# Patient Record
Sex: FEMALE | Race: WHITE
[De-identification: names, ages, dates, MRNs, and addresses within clinical notes are randomized per-mention and may not be internally consistent; named-entity substitution may affect disease eponyms.]

---

## 2019-11-25 NOTE — NUR
TELE RN NOTES 



ON TELEMONITORING SR 55. RESTING COMFORTABLY IN BED. LAILA MECHANICAL VENTILATION WELL AS 
ORDERED. NO SOB. ABLE TO MAKE NEEDS KNOWN. IN NO APPARENT DISTRESS. BED IN LOWEST POSITION, 
LOCKED. CALL LIGHT WITHIN REACH.

## 2019-11-25 NOTE — NUR
RT NOTE, PT. REC @0940 AM 

74 Y OLD FEMALE AWAKE NON VERBAL, 

TRACH'D KHALIDA # 6 ON PLACED ON VENT WITH NOTED SIMV SETTINGS. PER MD ORDER.

EQUAL CHEST RISE NOTED B/S BILATERALLY RALES SUX'D FOR MINIMAL AMT OF WHITE THICK 
SECRETIONS, NO CHANGES MLT DONE. ALARMS ARE SET AND FUNCTIONAL.

PT. VENT PLUGGED INTO RED OUT LET. AMBU BAG/EXTRA TRACH AT BEDSIDE.  WILL CONTINUE TO 
MONITOR. PT. STABLE AND VITAL SIGN WITHIN NORMAL RANGE.


-------------------------------------------------------------------------------

Addendum: 11/25/19 at 1217 by JAVIER SANTIAGO RT

-------------------------------------------------------------------------------

Amended: Links added.

## 2019-11-25 NOTE — NUR
TELE RN NOTES



RECEIVED PATIENT VIA GURTERESA. ALERT AND ORIENTED X4. PATIENT NON VERBAL BUT ABLE TO MOUTH 
NEEDS AND ANSWER QUESTIONS. RIGHT UPPER CHEST HD CATHETER INTACT. ON SIMV 16  FIO2 OF 
40% PEEP OF 5 WITH SHILEY #6 TRACH INTACT LAILA WELL WITHOUT S/S OF RESPIRATORY DISTRESS. 
ORIENTED PATIENT TO ROOM, CALL LIGHT AND UNIT. BED IN LOWEST POSITION, LOCKED. RIGHT WRIST # 
20 INTACT AND PATENT WITHOUT S/S OF COMPLICATIONS. SON AT BEDSIDE. BED SIDERAILS UP X2. BED 
ALARM ON. GT INTACT AND PATENT. NO RESIDUAL OBSERVED.

## 2019-11-25 NOTE — NUR
PT BIBA FROM [facility] C/O HYPOTENSION SBP 98 PER REPORT. PT PLACED ON A CARDIAC 
MONITOR AND POX. MD AND RT AT BEDSIDE. IV LINE ESTABLISHED 20G RW . 
PHLEBOTOMIST AT BEDSIDE FOR COLLECTION OF BLOOD.

## 2019-11-25 NOTE — NUR
TELE RN NOTES 



RECEIVED PATIENT AWAKE, A/OX4 WITH NO DISTRESS NOTED. CALL LIGHT WITHIN REACH. PATIENT TRACH 
INTACT/PATENT AND PATIENT BREATHING EASILY WITH VENT SETTING AS ORDERED. ONGOING TELE 
MONITORING SHOWING SB AT 54 BMP. NO C/O HA OR DIZZINESS. GT INTACT/PATENT. RUC  PERMA CATH 
INTACT WITH NO REDNESS, SWELLING, OR BLEEDING NOTED. NO C/O PAIN OR DISCOMFORT. BED IN LOW 
LOCK SETTING. ALL BELONGINGS KEPT NEAR BEDSIDE. WILL CONTINUE TO MONITOR.

## 2019-11-25 NOTE — NUR
SPOKE TO MICKY (SUPERVISOR), OF [facility] IN 
[facility]. [phone]

## 2019-11-26 NOTE — NUR
RN NOTE

PATIENT IN BED IN STABLE CONDITION. TRACH CARE DONE WITH PT TOLERATING WELL. KEPT CLEAN AND 
DRY. REPOSITIONED. GT SITE LIGHTLY COVERED WITH DRESSING PER MD WITH FEEDINGS HELD EXCEPT 
FOR MEDICATIONS. CALL LIGHT WITHIN REACH, WILL CONTINUE TO MONITOR.

## 2019-11-26 NOTE — NUR
TELE RN NOTES 



PATIENT ASLEEP IN BED WITH NO DISTRESS NOTED. CALL LIGHT WITHIN REACH. RUC PERMA CATH INTACT 
WITH NO BLEEDING, SWELLING, REDNESS NOTED. DIALYSIS NURSE AT BEDSIDE. NO FURTHER C/O PAIN OR 
DISCOMFORT. GT INTACT AND PATENT. PERIPHERAL LINES INTACT AND PATENT.  BED IN LOW LOCK 
SETTING. ROOM FREE OF CLUTTER AND BELONGINGS KEPT NEAR BEDSIDE. BED ALARM ON AND FUNCTIONING 
PROPERLY. WILL ENDORSE TO ONCOMING SHIFT.

## 2019-11-26 NOTE — NUR
TELE RN NOTES

DOCTOR MARTINEZ AT BEDSIDE. NEW G-TUBE INSERTED. OK TO HAVE MEDICINE BUT HOLD TUBE FEEDING AT 
THIS TIME. FAMILY AWARE.

## 2019-11-26 NOTE — NUR
TELE RN NOTE 

 [facility] LEVEL 22 OK TO HOLD [facility] PHARMACY NOTIFIED

## 2019-11-26 NOTE — NUR
tele rn note 

 g tute with balloon is partially removed , dr holbrook at bedside with order gi consult

## 2019-11-26 NOTE — NUR
RN NOTE

RECIEVED PATIENT IN BED. ALERT AND ORIENTED X 4. NO SOB NOTED. ON VENTILATOR. NOTED WITH 
BILATERAL ANTERIOR RHONCHI. CURRENTLY ON HEMODIALYSIS. DIALYSIS RN AT BEDSIDE. BLOOD 
PRESSURE STABLE. WITH COMPLAINT OF 8/10 PAIN RELATED TO REDNESS AT GT SITE. PLACEMENT 
VERIFIED. ADMINISTERED MORPHINE 2MG IV X 1. IV SITE IN PLACE AND FLUSHED WELL. WILL CONTINUE 
TO MONITOR.

## 2019-11-26 NOTE — NUR
RT NOTE



PT RECEIVED AWAKE/ALERT, TRACHED ON MECHANICAL VENTILATION. AMBU BAG/ BACK UP TRACH @ 
BEDSIDE. TX GIVEN, NO ADVERSE REACTIONS NOTED. SX DONE, TRACH SECURED AND PATENT. ALARMS ON 
AND AUDIBLE. VENT PLUGGED TO RED OUTLET. CONT. PULSE OX CONNECTED. NO SOB NOTED AT THIS 
TIME. 

-------------------------------------------------------------------------------

Addendum: 11/26/19 at 2021 by AMARILIS LOOMIS RT

-------------------------------------------------------------------------------

Amended: Links added.

## 2019-11-26 NOTE — NUR
TELE RN NOTES

PT AND ST AT BEDSIDE. PATIENT IS NOT A CANDIDATE FOR PT AND ST TREATMENT AT THIS TIME.

## 2019-11-26 NOTE — NUR
WOUND CARE CONSULT: PT PRESENTS WITH G TUBE ISSUE (RAISED AREA WITH REDNESS AND TENDERNESS), 
PRESENT ON ADMISSION. G TUBE IS CLAMPED. RN TO DISCUSS WITH MD. DEFER TO MD FOR G TUBE. PT 
ALSO PRESENTS WITH DISCOLORATION TO ARMS WITH FRAGILE SCAR TO RT ARM AND LEFT THIGH DRY 
SCRATCH, PRESENT ON ADMISSION. PT HAVING DIALYSIS AT THIS TIME AND NOT TURNED FOR FULL SKIN 
ASSESSMENT DUE TO HD IN PROGRESS. PER ADMISSION PHOTOS, SCARRING NOTED TO SACRAL AREA WHICH 
EXTENDS TO BILATERAL BUTTOCKS. RECOMMENDATIONS MADE FOR SKIN PROTECTION BASED ON ADMISSION 
PHOTOS. DISCUSSED WITH NURSING STAFF. FIRST STEP LOW AIRLOSS MATTRESS ON ORDER. WILL SEE 
PRN. CURRENT CHRISTINA SCORE IS 12. 

-------------------------------------------------------------------------------

Addendum: 11/26/19 at 0806 by BOB SARGENT WNDNU

-------------------------------------------------------------------------------

Amended: Links added.

## 2019-11-26 NOTE — NUR
RN NOTE. INITIAL ASSESSMENT. RECEIVED THE PT REST ON THE BED.TRACH TO VENT CONNECTED. PT IS 
AWAKE, ALERT. POOR CONCENTRATION. SHILEY#6,AC 16,,FIO2 40%,PEEP 5. SAT 98%, NO ACUTE 
DISTRESS NOTED. CARDIAC MONITOR SHOWING NSR. IV RT HAND, SALINE LOCK.  GT CLAMPED. HOB 
ELEVATED. WILL CONTINUE TO MONITOR VITALS.

## 2019-11-26 NOTE — NUR
RT NOTE

[facility]. No resp distress or sob noted. Pt awake and 
alert. trach is patent and secured. sx'd for mod amt of pale yellow secretions. alarms are 
set and audible. vent plugged into red outlet. ambu bag bedside. will continue to monitor.

-------------------------------------------------------------------------------

Addendum: 11/26/19 at 0554 by HASMUKH MURRAY RT

-------------------------------------------------------------------------------

Amended: Links added.

## 2019-11-27 NOTE — NUR
RN OPENING NOTES

REPORT RECEIVED FROM NIGHT SHIFT RN. PT IS CURRENTLY SLEEPING. PT IS ON A VENTILATOR AND IS 
TOLERATING WELL WITH EQUAL CHEST RISE AND FALL BILATERALLY. PT APPEARS TO BE COMFORTABLE AT 
PRESENT TIME WILL CONTINUE TO MONITOR. BED IS LOCKED AND IN LOWEST POSITION WITH CALL LIGHT 
IN REACH.

## 2019-11-27 NOTE — NUR
RN CLOSING NOTES

PT IS A&OX4. REPORT GIVEN TO NIGHT SHIFT LVN FOR AJAY. PT IS SLEEPING IN BED AT 45 DEGREE 
ANGLE. WILL ENDORSE AJAY TO NIGHT SHIFT LVN

## 2019-11-27 NOTE — NUR
TELE RN NOTE:



RECEIVED BEDSIDE REPORT FROM SLY BRUCE. PT IS ASLEEP BUT AROUSES EASILY TO VERBAL AND 
TACTILE STIMULI. NO APPARENT DISTRESS NOTED. NO COMPLAINTS OF PAIN OR DISCOMFORT AT THIS 
TIME. ON [facility] VENT, SETTINGS AS ORDERED. NO SOB NOTED. SINUS SHELDON ON TELE MONITOR HR 55 
BPM. FEEDING HELD AS ORDERED. KEPT CLEAN, DRY AND COMFORTABLE. CALL LIGHT PLACED WITHIN 
REACH. SAFETY AND FALL PRECAUTIONS OBSERVED AND MAINTAINED. WILL CONTINUE TO MONITOR PT.

## 2019-11-27 NOTE — NUR
RT



RECEIVED PT TRACH'D ON [facility] VENT WITH SETTINGS PER MD ORDER. MLT DONE. SPARE TRACH AND AMBU 
BAG AT HEAD OF BED. PT AWAKE AND RESPONSIVE. BREATHING TX'S GIVEN AS ORDERED. NO ADVERSE 
REACTIONS OBSERVED. SUCTIONED AND MONITORED PRN. NO SOB NOTED. WILL CONTINUE TO MONITOR FOR 
ANY CHANGES. 

-------------------------------------------------------------------------------

Addendum: 11/27/19 at 1536 by NICOLE KABA RT

-------------------------------------------------------------------------------

Amended: Links added.

## 2019-11-27 NOTE — NUR
PT RCVD TRACH'D ON MECHANICAL VENT WITH CHARTED SETTINGS. PT LAILA TX WELL. SX DONE. PT TRACH 
IS PATENT AND SECURE. VENT ALARMS APPEAR TO BE FUNCTIONING PROPERLY. VENT PLUGGED INTO RED 
OUTLET. AMBU BAG AT BEDSIDE. NO SOB NOTED.

-------------------------------------------------------------------------------

Addendum: 11/27/19 at 2125 by WERNER CHIRINOS RT

-------------------------------------------------------------------------------

Amended: Links added.

## 2019-11-27 NOTE — NUR
TELE RN NOTE:



NO CHANGES NOTED THROUGHOUT THE SHIFT. NO APPARENT DISTRESS NOTED. PT COMPLAINED OF 
GENERALIZED BODY PAIN 8/10, MORPHINE PRN GIVEN AS ORDERED. [facility] AS 
ORDERED. NO SOB NOTED. SINUS SHELDON ON TELE MONITOR HR 57 BPM. KEPT CLEAN, DRY AND 
COMFORTABLE. SAFETY AND FALL PRECAUTIONS OBSERVED AND MAINTAINED. WILL ENDORSE TO DAY SHIFT 
RN FOR CONTINUITY OF CARE.

## 2019-11-28 NOTE — NUR
RN NOTE

RECEIVED PATIENT IN BED WITH HEAD OF BED ELEVATED, NO SOB NOTED, ALERT AND ORIENTED X 4,, 
COMUNICATES BY NODDING AND WRITING, WITH HEP LOCKS ON LEFT AND RIGHT WRIST BOTH PATENT AND 
FLUSHING WELL, SKIN CLEAN AND DRY, SACRAL IN PLACE, GT IN PLACE AND FLUSHING WELL, WITH 
RIGHT SUBCLAVIAN HD CATHETER, NO COMPLAINTS OF PAIN OR DISCOMFORT, BED ALARM ON, WILL 
CONTINUE TO MONITOR

## 2019-11-28 NOTE — NUR
RN NOTE

PATIENT IN BED SLEEPING WITHOUT SIGNS OF DISCOMFORT. NO SOB NOTED. CALL LIGHT WITHIN REACH. 
WILL CONTINUE TO MONITOR.

## 2019-11-28 NOTE — NUR
RN OPENING NOTES:



PATIENT ALERT AND ABLE TO MOUTH AND WRITE WORDS. SON AT BEDSIDE. NO RESPIRATORY DISTRESS. 
(L) WRIST #24 AND (R) WRIST #20 INTACT, PATENT, AND FLUSHING WELL. SAFETY PRECAUTIONS HAVE 
BEEN IMPLEMENTED. BED LOCKED AND IN LOWEST POSITION. CALL LIGHT PLACED WITHIN REACH. WILL 
CONT. TO MONITOR.

-------------------------------------------------------------------------------

Addendum: 11/28/19 at 2046 by JOSE VICENTE RN

-------------------------------------------------------------------------------

UPON ENDORSEMENT FROM AM SHIFT, GT SITE LEAKING, DR. MARTINEZ AWARE AND TO REEVALUATE PEG 
TUBE FOR FEEDING TOMORROW. GT SITE REMAINS INTACT. KEPT CLEAN AND DRY. WILL CONT. TO 
MONITOR.

## 2019-11-28 NOTE — NUR
RT NOTE



PT RECEIVED TRACHED ON MECHANICAL VENTILATION. AMBU BAG/BACK UP TRACH @ BEDSIDE. TX GIVEN, 
NO ADVERSE REACTIONS NOTED. SX DONE, TRACH SECURED AND PATENT. ALARMS ON AND AUDIBLE. NO SOB 
NOTED. CONT. PULSE OX CONNECTED. WILL CONTINUE TO MONITOR T/O SHIFT.

-------------------------------------------------------------------------------

Addendum: 11/28/19 at 2117 by AMARILIS LOOMIS RT

-------------------------------------------------------------------------------

Amended: Links added.

## 2019-11-28 NOTE — NUR
tele rn note 

 dr joy horton at bedside, seen patient  stated cont hold  g tube feeding  ok to give Meds 

-------------------------------------------------------------------------------

Addendum: 11/28/19 at 1303 by YESSY HAWKINS RN

-------------------------------------------------------------------------------

 NORCO VIA AG TUBE GIVEN AS ORDERED, WILL MONITOR

## 2019-11-29 NOTE — NUR
TELE RN NOTES



DR. MARTINEZ AT BEDSIDE. PER HIM, NOT TO PUT ANY DRESSINGS ON GT SITE AND LEAVE OPEN TO AIR. 
ALSO TO USE IT ONLY FOR MEDICATIONS AND NO FLUSHINGS OF LARGE AMOUNT OF WATER. 

PER DR. MARTINEZ, INFORM PRIMARY MD THAT PATIENT MIGHT NEED TPN OF IVF.

## 2019-11-29 NOTE — NUR
TELE RN NOTES



DR. SALCEDO NOTIFIED ABOUT DR. MARTINEZ'S MESSAGE THAT PATIENT NOT READY FOR AN Y 
PROCEDURE YET AND THAT PT MIGHT NEED TPN OR IVF FOR NOW.

## 2019-11-29 NOTE — NUR
RN CLOSING NOTES:



PATIENT ASLEEP BUT EASILY AROUSABLE. TOLERATING VENT TRACH SETTINGS. NO RESPIRATORY 
DISTRESS. NO PAIN OR FACIAL GRIMACING. REMAINS NPO EXCEPT MEDS. ENDORSED TO AM SHIFT NURSE 
FOR CONTINUITY OF CARE.

## 2019-11-29 NOTE — NUR
TELE RN NOTES 

RECEIVED PTS IN BED AWAKE  ALERT ABLE  MOUTHWORD  OR WRITING  ON THE  BOARD  FOR  NEEDS . 
PTS REMAIN  ON VENT .SETTING WELL TOLERATED  BY PTS.NO SOB NO DISTRESS NOTED  PTS ON SB  -59 
ON THE  MONITOR .SATING 100%.ALL NEEDS ATTENDED TOO CALL LIGHT WITHIN REACH KEPT PTS  CLEAN 
DRY AND  COMFORTABLE, HOB ELEVATED AT ALL TIMES , SUCTION SECRETION  DONE, BED IN LOCKED AND 
LOWEST POSITION FOR SAFETY. PTS IS  NPO  EXCEPT  MEDS ,GT IS CLAMP  NOTED WITH  MINIMAL 
LEAKING . WILL CONTINUE TO MONITOR PTS.

## 2019-11-29 NOTE — NUR
TELE RN AM NOTE



RECEIVED PATIENT IN BED, ASLEEP,RESPONDS TO NAME AND TOUCH, ALERT AND ORIENTED X 4,ABLE TO 
COMMUNICATE,NODS HEAD, WRITES AND MOUTH WORDS, SR  HR 53 ON TELE MONITOR, NO SIGNS OF 
PAIN,LEFT AND RIGHT WRIST SALINE LOCK, BOTH FLUSHES WELL AND BOTH SITES CLEAR. WITH RIGHT 
SUBCLAVIAN HD CATHETER, CDI DRESSING. GT IN PLACE, LEAKS, CLAMPED FOR NOW BUT MAY BE USED 
FOR MEDS ONLY. DR. MARTINEZ TO F/U ON PATIENT.  SEE NURSING FLOWSHEET FOR SKIN ISSUES. BED 
ALARM ON, CALL LIGHT WITH IN REACH. WILL CONTINUE TO MONITOR

## 2019-11-29 NOTE — NUR
TELE RN NOTES



ALL NEED MET AT THIS TIME.

PATIENT RESTING COMFORTABLY IN BED, PM CARE DONE, PRESCRIBED WOUND CARE DONE.

TURNED AND REPOSITIONED. SUCTION PRN

STILL NO MESSAGE FROM DEION CAGLE RN RE DR. FAHRAMANDIAN  FOR DTR'S TRANSFER REQUEST.

WILL ENDORSE TO NEXT SHIFT FOR AJAY.

## 2019-11-29 NOTE — NUR
PT RECEIVED TRACHED SHILEY 6  ON MECHANICAL VENTILATION WITH NOTED SETTINGS.  PT IS AWAKE 
AND ALERT.  AMBU BAG/BACK UP TRACH @ BEDSIDE. BREATHING  TX GIVEN, NO ADVERSE REACTIONS 
NOTED. SX DONE, TRACH SECURED AND PATENT. ALARMS ON AND AUDIBLE. NO SOB NOTED. CONT. PULSE 
OX CONNECTED. WILL CONTINUE TO MONITOR T/O SHIFT.

## 2019-11-29 NOTE — NUR
RT NOTE



PT REMAINS MECHANICALLY VENTILATED VIA CUFFED TRACHEOSTOMY TUBE. CUFF INFLATED. TRACH TUBE 
MIDLINE AND SECURE. VENTILATOR SETTINGS AS PRESCRIBED. ALARMS SET PER PROTOCOL AND AUDIBLE. 
VENT PLUGGED IN TO RED OUTLET. AMBU BAG AT BED SIDE. NO DISTRESS NOTED. 

-------------------------------------------------------------------------------

Addendum: 11/29/19 at 1707 by MARIAH SHERMAN RT

-------------------------------------------------------------------------------

Amended: Links added.

## 2019-11-29 NOTE — NUR
TELE RN NOTE



PT'S DAUGHTER BK ABLE TO SPEAK WITH DR. MARTINEZ, AND EXPLAINED TO HER THAT GT IS 
INFECTED AND CAN NOT STILL BE USED AS OF THIS TIME. 

NOW PER HER, SHE WANTS TO TRANSFER PATIENT TO ANOTHER FACILITY AND WANTS CASE MANAGEMENT TO 
SPEAK TO THEIR DOCTOR ILENE SANTACRUZ [phone].

DEION CAGLE CASE MANAGEMENT WAS INFORMED. ACCORDING TO HER, PT'S DAUGHTER WANTS OUR 
HOSPITALIST TO TALK TO DR. MAR. PER HER, SHE INFORMED DR. PULIDO AND WAITING FOR A 
CALL BACK.

## 2019-11-30 NOTE — NUR
RN NOTES



PICC LINE PLACED

-------------------------------------------------------------------------------

Addendum: 11/30/19 at 1417 by EDD ROUSE RN

-------------------------------------------------------------------------------

ADDENDUM



INCIDENT REPORT DONE ON FILE



Unique Id: [id number] 

-------------------------------------------------------------------------------

Addendum: 11/30/19 at 1634 by EDD ROUSE RN

-------------------------------------------------------------------------------

CORRECTION:



UNSUCCESSFUL PICC LINE

## 2019-11-30 NOTE — NUR
RT NOTE



PT RECEIVED TRACHED ON MECHANICAL VENTILATION. AWAKE/ALERT. AMBU BAG/BACK UP TRACH @ 
BEDSIDE. TX GIVEN, NO ADVERSE REACTIONS NOTED. SX DONE, TRACH SECURED AND PATENT. ALARMS ON 
AND AUDIBLE. NO SOB NOTED. WILL MONITOR T/O SHIFT. CONT. PULSE OX CONNECTED.

-------------------------------------------------------------------------------

Addendum: 11/30/19 at 2002 by AMARILIS LOOMIS RT

-------------------------------------------------------------------------------

Amended: Links added.

## 2019-11-30 NOTE — NUR
TELE RN NOTES



ACCUCHECK.  MG/DL, NO INSULIN COVERAGE, PT IS NPO.



PER GUILLAUME, NOT TO USE CENTRAL LINE UNLESS WITH FINAL XRAY READING.



PATIENT TO START TPN TONIGHT.

## 2019-11-30 NOTE — NUR
TELE RN NOTES



SPOKE WITH BK PT'S SON - PER HIM, WE COULD ASK THE PATIENT TO SIGN CONSENT FOR PICC 
LINE.

EXPLAINED PROCEDURE TO PATIENT WITH CHARGE NURSE MATTHEW, AND CONSENT SIGNED.

## 2019-11-30 NOTE — NUR
TELE RN NOTES





SPOKE WITH DAUGHTER BK REGARDING PT FOR PICC LINE AND NEEDS CONSENT. ACCORDING TO HER, 
DR. SALCEDO ALREADY TALKED TO HER, BUT SHE STILL DOES NOT WANT TO GIVE CONSENT FOR PICC 
LINE PLACEMENT UNTIL SHE MAKES SURE THAT DR. SALCEDO AND THEIR PRIMARY DOCTOR WES 
HAS TALKED TO EACH OTHER. PER HER SHE WILL CALL BACK TO GIVE CONSENT FOR PICC LINE ONCE SHE 
CONFIRMS WITH DR. HARVEY.

## 2019-11-30 NOTE — NUR
bonnie oreilly

spoke to  dr diallo higgins concern of the daughter, pcp dr mclaughlin tel # [phone]

-------------------------------------------------------------------------------

Addendum: 11/30/19 at 0730 by GONZALEZ SENA RN

-------------------------------------------------------------------------------

bonnie  rn notes , spoke to dr diallo higgins pts daughter  concern  ,Pcp dr Delmi montoya 
 tel #[phone] given to dr landrum.

## 2019-11-30 NOTE — NUR
RN NOTES





RECEIVED A CALL FROM DAUGHTER BK, PER HER, DR. MAR IS STILL WAITING A CALL FROM DR. QURESHI. 

ALSO PT'S DAUGHTER WANTS THE PICC LINE NURSE TO EXPLAIN TO HER THE PROCEDURE BEFORE SHE 
AGREE FOR THE CONSENT.

INFORMED NURSING SUPERVISOR ABOUT THE WHOLE SITUATION. CHARGE NURSE MATTHEW RENO.

## 2019-11-30 NOTE — NUR
TELE RN NOTES 

V/S STABLE AFEBRILE, NO SOB NO DISTRESS  NOTED , PTS IS COMFORTABLE IN BED , NO COMPLAIN OF 
PAIN NOTED , SATING 100%.

## 2019-11-30 NOTE — NUR
RN OPENING NOTES:



PATIENT ALERT AND ABLE TO MOUTH AND WRITE WORDS. ON MECH VENT TRACH, TOLERATING WELL. NO 
RESPIRATORY DISTRESS. IV ACCESS (L) WRIST #24 AND (R) WRIST #20 INTACT, PATENT, AND FLUSHING 
WELL. SAFETY PRECAUTIONS HAVE BEEN IMPLEMENTED. BED LOCKED AND IN LOWEST POSITION. GT SITE 
LEAKING. MD AWARE. REMAINS NPO STATUS. PER ENDORSEMENT, PICC LINE TO BE ADJUSTED AND START 
TPN AFTER 3RD KUB. CALL LIGHT PLACED WITHIN REACH. WILL CONT. TO MONITOR.

## 2019-11-30 NOTE — NUR
RN NOTE:



CALLED RADIOLOGY AND SPOKE WITH JOCELYN FOR STAT KUB SECONDARY TO S/P RIGHT FEMORAL PICC 
INSERTION. PICC LINE NURSE STILL AT BEDSIDE.

## 2019-11-30 NOTE — NUR
TELE RN AM NOTE



RECEIVED PATIENT IN BED, ASLEEP,RESPONDS TO NAME AND TOUCH, ALERT AND ORIENTED X 4,ABLE TO 
COMMUNICATE,NODS HEAD, WRITES AND MOUTH WORDS, SR  HR 53 ON TELE MONITOR, NO SIGNS OF 
PAIN,LEFT AND RIGHT WRIST SALINE LOCK, BOTH FLUSHES WELL AND BOTH SITES CLEAR. WITH RIGHT 
SUBCLAVIAN HD CATHETER, CDI DRESSING. GT IN PLACE, LEAKS, CLAMPED FOR NOW BUT MAY BE USED 
FOR MEDS ONLY. DR. MARTINEZ TO FOLLOWING UP ON PATIENT.  SEE NURSING FLOWSHEET FOR SKIN 
ISSUES. BED ALARM ON, CALL LIGHT WITH IN REACH. PER DR. SALCEDO PT TO START TPN TODAY. 
COORDINATED WITH PHARMACY- [facility], PT WILL BE NEEDING PICC LINE. NURSING SUPERVISOR. NOTIFIED. 
WILL CONTINUE TO MONITOR

## 2019-11-30 NOTE — NUR
tele rn notes

pts remains on vent  setting well tolerated  v/s stable  afebrile  no sob no distress 
noted.,will endorse to rn day shift for continuity of care.

## 2019-11-30 NOTE — NUR
RN NOTES



2ND KUB ABDOMEN RESULTED. SENT TO [facility] PICC LINE NURSE.

## 2019-11-30 NOTE — NUR
RN NOTE:



DR. RAMIREZ MADE AWARE OF KUB RESULT SECONDARY TO S/P PICC LINE INSERTION. PER MD, OK TO 
START TPN.

## 2019-11-30 NOTE — NUR
RN NOTE





STILL NO RETURN CALL FROM PT'S DAUGHTER BK TO GIVE CONSENT FOR PICC LINE INSERTION. CASE 
MANAGEMENT NOTIFIED BY CHARGE NURSE MATTHEW.

## 2019-11-30 NOTE — NUR
TELE RN NOTES



ALL NEED MET AT THIS TIME.

PATIENT RESTING COMFORTABLY IN BED, PM CARE DONE, PRESCRIBED WOUND CARE DONE.

TURNED AND REPOSITIONED. SUCTION PRN

FOR POSSIBLE DISCHARGE TO [place]. PT'S SON BK HERE EARLIER AND SPOKE WITH DEION CAGLE CASE MANAGEMENT. 

WILL ENDORSE TO NEXT SHIFT FOR AJAY.

## 2019-11-30 NOTE — NUR
RN NOTES



SPOKE WITH DAUGHTER BK, PER HER, SHE STILL WANTS TO TRANSFER THER MOM TO ANOTHER 
FACILITY. PER HER, NOBODY TALKED TO HER PRIMARY DOCTOR DR. HARVEY. SHE DOES NOT WANT PICC 
LINE OR TPN TO BE STARTED. NOTIFIED DEION AZEVEDOIVEN CASE MANAGEMENT. PER DEION SHE WILL TALK 
TO BK AND DR. SALCEDO.

## 2019-11-30 NOTE — NUR
RT



Pt received trached on mechanical ventilation with noted settings. Pt is awake and alert. 
Vent is plugged into red outlet. No SOB or respiratory distress noted at this time.

-------------------------------------------------------------------------------

Addendum: 11/30/19 at 1531 by DOLORES HURST RT

-------------------------------------------------------------------------------

Amended: Links added.

## 2019-11-30 NOTE — NUR
RN NOTE:



PATIENT NOTED WITH GT PULLED OUT. CHARGE NURSE PLACED BACK GT FOR PATENCY, DR. RAMIREZ 
AWARE. PER MD, HOLD ALL MEDS FOR NOW AND F/U WITH GI DOCTOR IN AM FOR GT REINSERTION. WILL 
ENDORSE TO AM SHIFT NURSE.

## 2019-11-30 NOTE — NUR
TELE RN NOTES



GUILLAUME PICC LINE NURSE SPOKE WITH BK DAUGHTER AND EXPLAINED TO HER THE NECESSITY OF PICC 
LINE. PER DAUGHTER, GUILLAUME SAID ITS NOT HER WHO IS GOING TO GIVE CONSENT BUT RATHER HIS 
FATHER. THE ONLY REASON WHY SHE IS TALKING IS BECAUSE, SHE IS THE ONE WHO UNDERSTANDS 
MEDICAL TERMS.

## 2019-12-01 NOTE — NUR
RN NOTE:



MAALOX NOT ADMINISTERED AS ORDERED. ALL MEDS ON HOLD. S/P GT PULLED OUT. NO BLEEDING NOTED.

## 2019-12-01 NOTE — NUR
RN NOTE:



MAALOX NOT ADMINISTERED AS ORDERED. ALL MEDS ON HOLD PER DR. RAMIREZ. S/P GT PULLED OUT. NO 
BLEEDING NOTED.

## 2019-12-01 NOTE — NUR
RN CLOSING NOTES:



PATIENT ON MECH VENT TRACH, TOLERATING WELL. NO RESPIRATORY DISTRESS. SAFETY PRECAUTIONS 
HAVE BEEN IMPLEMENTED. BED LOCKED AND IN LOWEST POSITION. TPN STILL RUNNING, TOLERATING 
WELL. PATIENT REMAINS NPO. ENDORSED TO AM SHIFT NURSE FOR CONTINUITY OF CARE AND TO F/U WITH 
GI DOCTOR REGARDING S/P GT PULLED OUT.

## 2019-12-01 NOTE — NUR
TELE RN NOTE

PT IN BED AWAKE. ON TRACH/VENT TOLERATING THE SETTINGS WELL. NO DISTRESS OR DISCOMFORT 
NOTED. DENIES PAIN. ABLE TO MOUTH/WRITE WORDS. TPN INFUSING AT 40 ML/HR RT FEMORAL TRIPLE 
LUMEN CATH. SL IN RT WRIST AND LT HAND INTACT AND PATENT. GT INTACT AND PATENT BUT NO 
BALLON, PER DAY SHIFT NURSE MD IS AWARE. STILL OK TO GIVE MEDS. VSS. REPOSITION HER FOR SKIN 
AND COMFORT. SIDE RAILS UP X 2 AND CALL LIGHT WITHIN REACH. CONTINUE TO MONITOR HER.

## 2019-12-02 NOTE — NUR
RN NOTES



RECEIVED PATIENT IN BED ASLEEP BUT AROUSES EASILY TO VERBAL AND TACTILE STIMULI. ABLE TO 
COMMUNICATE THROUGH WRITING.  NOT ON ANY FORM OF DISTRESS. ON [facility] VENT, SETTINGS AS 
ORDERED. TOLERATED WELL.  NO SOB NOTED. SINUS SHELDON ON TELE MONITOR HR ON  58 BPM AT THIS 
TIME. FEEDING HELD AS ORDERED. WITH TPN  RUNNING AT 40CC/HR OVER THE L HAND G 24 IV ACCESS. 
PATIENT ENCOURAGE TO CALL FOR HELP/ ASSISTANCE. TO VERBALIZE NEEDS,  FEELINGS AND CONCERNS. 
HOB KEPT ELEVATED. SAFETY MEASURES IN PLACE. BED IN LOW AND LOCKED POSITIONED. CALL LIGHT 
PLACED WITHIN REACH. WILL CONTINUE TO MONITOR AND ANTICIPATE NEEDS.

## 2019-12-02 NOTE — NUR
RN NOTES



ENDORSED FOR CONTINUITY OD CARE. NOT ON ANY FORM OF DISTRESS. NO COMPLAINTS OF PAIN. TPN BAG 
#3 RUNNING TAT 40CC/HR. HOB ELEVATED. SAFETY MEASURES IN PLACE. BED IN LOW AND LOCK 
POSITION. CALL LIGHT WITHIN REACH.

## 2019-12-02 NOTE — NUR
TELE RN NOTE

NO CHANGE IN CONDITION. PT SLEPT ON AND OFF. SUCTIONED HER FREQUENTLY. ALL NEEDS ATTENDED. 
IVF INFUSING WELL, NO S/S OF  INFILTRATION NOTED. KEPT HER DRY AND CLEAN. PRESSURE DRESSING 
ON RT FEMORAL SITE. NO BLEEDING NOTED. SIDE  RAILS UP X 3 AND CALL LIGHT WITHIN REACH. VSS. 
WILL ENDORSE TO DAY SHIFT NURSE FOR CONTINUE TO CARE.

## 2019-12-02 NOTE — NUR
RN NOTES



CALLED PHARMACY TOP CONFIRMED IF TPN CAN BE ADMINISTERED TROUGH PERIPHERAL LINE, PER [place] 
[facility]- SHOULD BE RUNNING THTOUGHT A CENTRAL LINE. OBTAINED ORDER FOR CENTRAL LINE. GUERITA, 
SUPERVISOR, INFORMED

## 2019-12-02 NOTE — NUR
RECEIVED PT ON VENT SIMV. PT IS AWAKE ALERT NO DISTRESS NOTED. SX'D FOR SML AMT OF THIN 
WHITE SECRETIONS. VENT ALARMS SET AND AUDIBLE. AMBU BAG AT BEDSIDE. TRACH IS SECURE, CUFF 
MLT. CONTINUE [facility] VENT SUPPORT.

-------------------------------------------------------------------------------

Addendum: 12/02/19 at 2200 by MAIRVEL ARREDONDO RT

-------------------------------------------------------------------------------

Amended: Links added.

## 2019-12-02 NOTE — NUR
SLY NOTES



DR. LORD INFORMED THAT PATIENT WAS NOT DISCHARGE

-------------------------------------------------------------------------------

Addendum: 12/02/19 at 1921 by GERMAN VACA RN

-------------------------------------------------------------------------------

CENTRAL LINE INSERTED. TPN #3 BAG HANGED

## 2019-12-02 NOTE — NUR
RN NOTES



>DR. LORD WITH ORDER TO D/C PATIENT WITH SPECIAL INSTRUCTION TO CONFIRMED WITH GI  IF 
GT CAN BE USE FOR FEEDING.

> CALLED DR. MARTINEZ BUT PER THE LATTER, HE IS NOT ON CALL FOR TODAY

> CALLED DR. ZUNIGA BUT PHONE CALL DOES NOT GO THROUGH

> CALLED DR. HERCULES'S PHONE NUMBER [phone]  BUT PER THE OFFICE STAFF, MD IS AT  
[place] TODAY AND THEY DO NOT HAVE THE PHONE NUMBER THERE

>CALLED [phone] ([facility]) AND OBTAINED FROM THEM MD'S NUMBER AT [phone]. 
LEFT A MESSAGE

> DR. HERCULES CALLED BACK, PER MD HE DOES NOT KNOW THE PATIENT, HE HASNT SEENN THE PATIENT, 
HE DIDNT INSERT THE NEW GT AND HE WOULD NOT KNOW IF THE PATIENT CAN BE DDISCHERGE THROUGH A 
PHONE CALL. CASE MANAGEMENT MADE AWARE OF THIS

## 2019-12-03 NOTE — NUR
RN NOTES



CALLED DR. ZUNIGA IN LINE WITH GI CONSULT. PER THE MD, HE IS NOT ON CALL FOR TODAY HE'LL BE 
BACK FRIDAY.

## 2019-12-03 NOTE — NUR
TELE RN CLOSING NOTE





PATIENT IN BED WITH NO SIGN OF ANY DISTRESS. TOLERATING VENT WELL WITH NO SIGN OF ANY 
OBSTRUCTION OF SOB. PATIENT IS ABLE TO MOUTH SOME WORDS FOR COMMUNICATION. ANTHONY PICC LINE 
INTACT WITH TPN AND FAT EMULSION RUNNING WITH NO OBSTRUCTION. ALL NEEDS OF PATIENTS HAVE 
BEEN MET. ALL SAFETY PRECAUTIONS APPLIED. ENDORSED PATIENT TO MORNING SHIFT NURSE FOR AJAY.

## 2019-12-03 NOTE — NUR
RN NOTE

RECEIVE PT ALERT AND ORIENTED IN BED. PT COMMUNICATES BY WRITING. PT WITH VENT WITH RHONCHI 
LUNG SOUNDS. HEAD OF BED ELEVATED. NO COMPLAINTS OF PAIN OR DISCOMFORT. GT IN PLACE 
CONFIRMED WITH ASUCULTATION. TPN RUNNING WITH MIDLINE LINE ON RUE. KEPT CLEAN AND DRY. 
REFUSED TO BE REPOSITIONED.  CALL LIGHT WITHIN REACH. WILL CONTINUE TO MONITOR.

## 2019-12-03 NOTE — NUR
TELE RN OPENING NOTE



PATIENT IN BED ASLEEP WITH NO SIGN OF DISTRESS. PATIENT ON VENTILATOR AND TOLERATING WELL. 
ON THE MONITOR SHOWING SR/SB IN THE 50'S. CURRENTLY RECEIVING TPN AND LIPID THROUGH ANTHONY PICC 
LINE. PATIENT IS BEDBOUND. HAS ON DIAPER. ALL SAFETY PRECAUTIONS APPLIED WILL CONTINUE TO 
MONITOR.

## 2019-12-03 NOTE — NUR
RN NOTE



PATIENT IN BED ASLEEP WITH NO SIGN OF DISTRESS OR DISCOMFORT. PATIENT WITH HEAD OF BED 
ELEVATED ON VENTILATOR AND TOLERATING WELL. CURRENTLY RECEIVING TPN AND LIPID THROUGH ANTHONY 
PICC LINE.  SAFETY PRECAUTIONS IN PLACE. ENDORSED TO ONCOMING SHIFT.

## 2019-12-04 NOTE — NUR
RN NOTE

RECIEVED PATIENT IN BED. AWAKE AND ALERT X 4. HOB ELEVATED. ON MECHANICAL VENTILATOR AND 
TOLERATING WELL. CURRENTLY UNDERGOING HEMODIALYSIS WITH UF GOAL OF 1500ML. VITAL SIGNS 
STABLE. WITH TPN RUNNING VIA PICC LINE ON RUE. PATIENT REFUSED TO BE REPOSITIONED. CALL 
LIGHT WITHIN REACH. PT PENDING DISCHARGE TODAY.  WILL MONITOR.

## 2019-12-04 NOTE — NUR
RN NOTE

[facility] FROM [place] CALLED STATING THAT TPN WILL NOT BE AVAILABLE FOR 5 HOURS. SPOKE 
TO DR. LORD WHO ORDERED THAT WHILE PT IS WAITING FOR TPN AT [facility], PT WILL NEED TO BE ON 
D10W IV AT 75ML/HR AND RELAYE TO [facility].

## 2019-12-04 NOTE — NUR
RN NOTE

PATIENT DISCHARGED OUT OF FACILITY VIA STRETCHER ACCOMPANIED BY 2 EMTS AND ONE RT IN STABLE 
CONDITION. 

-------------------------------------------------------------------------------

Addendum: 12/04/19 at 1247 by GENO NOBLE RN

-------------------------------------------------------------------------------

BELONGINGS LIST SIGNED. ALL WOUND PICTURES TAKEN. ID BAND REMOVED. DID NOT REMOVED PICC 
LINE, PATIENT WILL CONTINUE TPN AND IV ANTIBIOTICS AT THE FACILITY. REPORT GIVEN TO RN AT 
FACILITY. SON WAS INFORMED ABOUT THE DISCHARGE.

## 2020-01-15 NOTE — NUR
pt's daughter called stated does not want pt to be admitted to [facility]. if any 
question to call her [phone], her pmd is dr gonsales [phone]

## 2020-01-15 NOTE — NUR
haydee, 74 year old female, per emt they noted rectal bleeding after HD. Pt is 
vent/trach pt. noted with LBM episode. alert and oriented x1 non verbal but is 
able to node head and follow commnads. breathing even with appropriate settings 
noted. noted with bilateral buttocks redness and inner groin redness. waiting 
to be seen by md.

## 2020-03-25 ENCOUNTER — HOSPITAL ENCOUNTER (INPATIENT)
Dept: HOSPITAL 54 - ER | Age: 75
LOS: 3 days | Discharge: NURSING, REHAB OR LONG-TERM CARE | DRG: 208 | End: 2020-03-28
Attending: INTERNAL MEDICINE | Admitting: INTERNAL MEDICINE
Payer: MEDICARE

## 2020-03-25 VITALS — BODY MASS INDEX: 26.58 KG/M2 | HEIGHT: 63 IN | WEIGHT: 150 LBS

## 2020-03-25 VITALS — SYSTOLIC BLOOD PRESSURE: 151 MMHG | DIASTOLIC BLOOD PRESSURE: 69 MMHG

## 2020-03-25 VITALS — DIASTOLIC BLOOD PRESSURE: 69 MMHG | SYSTOLIC BLOOD PRESSURE: 151 MMHG

## 2020-03-25 DIAGNOSIS — Z88.2: ICD-10-CM

## 2020-03-25 DIAGNOSIS — J47.9: Primary | ICD-10-CM

## 2020-03-25 DIAGNOSIS — Z99.11: ICD-10-CM

## 2020-03-25 DIAGNOSIS — I13.2: ICD-10-CM

## 2020-03-25 DIAGNOSIS — Z22.322: ICD-10-CM

## 2020-03-25 DIAGNOSIS — E03.9: ICD-10-CM

## 2020-03-25 DIAGNOSIS — J96.11: ICD-10-CM

## 2020-03-25 DIAGNOSIS — Z85.42: ICD-10-CM

## 2020-03-25 DIAGNOSIS — N18.6: ICD-10-CM

## 2020-03-25 DIAGNOSIS — I50.9: ICD-10-CM

## 2020-03-25 DIAGNOSIS — R13.10: ICD-10-CM

## 2020-03-25 DIAGNOSIS — D63.1: ICD-10-CM

## 2020-03-25 DIAGNOSIS — F39: ICD-10-CM

## 2020-03-25 DIAGNOSIS — Z93.0: ICD-10-CM

## 2020-03-25 DIAGNOSIS — Z93.1: ICD-10-CM

## 2020-03-25 DIAGNOSIS — N39.0: ICD-10-CM

## 2020-03-25 DIAGNOSIS — G93.40: ICD-10-CM

## 2020-03-25 DIAGNOSIS — E87.6: ICD-10-CM

## 2020-03-25 DIAGNOSIS — Z88.0: ICD-10-CM

## 2020-03-25 DIAGNOSIS — Z99.2: ICD-10-CM

## 2020-03-25 LAB
ALBUMIN SERPL BCP-MCNC: 2.6 G/DL (ref 3.4–5)
ALP SERPL-CCNC: 258 U/L (ref 46–116)
ALT SERPL W P-5'-P-CCNC: 24 U/L (ref 12–78)
AST SERPL W P-5'-P-CCNC: 23 U/L (ref 15–37)
BASOPHILS # BLD AUTO: 0 /CMM (ref 0–0.2)
BASOPHILS NFR BLD AUTO: 0.3 % (ref 0–2)
BILIRUB DIRECT SERPL-MCNC: 0.2 MG/DL (ref 0–0.2)
BILIRUB SERPL-MCNC: 0.4 MG/DL (ref 0.2–1)
BUN SERPL-MCNC: 45 MG/DL (ref 7–18)
CALCIUM SERPL-MCNC: 9.7 MG/DL (ref 8.5–10.1)
CHLORIDE SERPL-SCNC: 100 MMOL/L (ref 98–107)
CO2 SERPL-SCNC: 28 MMOL/L (ref 21–32)
CREAT SERPL-MCNC: 2 MG/DL (ref 0.6–1.3)
EOSINOPHIL NFR BLD AUTO: 7 % (ref 0–6)
GLUCOSE SERPL-MCNC: 111 MG/DL (ref 74–106)
HCT VFR BLD AUTO: 34 % (ref 33–45)
HGB BLD-MCNC: 11 G/DL (ref 11.5–14.8)
LIPASE SERPL-CCNC: 31 U/L (ref 73–393)
LYMPHOCYTES NFR BLD AUTO: 0.4 /CMM (ref 0.8–4.8)
LYMPHOCYTES NFR BLD AUTO: 5.8 % (ref 20–44)
MCHC RBC AUTO-ENTMCNC: 32 G/DL (ref 31–36)
MCV RBC AUTO: 100 FL (ref 82–100)
MONOCYTES NFR BLD AUTO: 0.4 /CMM (ref 0.1–1.3)
MONOCYTES NFR BLD AUTO: 5.8 % (ref 2–12)
NEUTROPHILS # BLD AUTO: 6.2 /CMM (ref 1.8–8.9)
NEUTROPHILS NFR BLD AUTO: 81.1 % (ref 43–81)
PLATELET # BLD AUTO: 126 /CMM (ref 150–450)
POTASSIUM SERPL-SCNC: 3.4 MMOL/L (ref 3.5–5.1)
PROT SERPL-MCNC: 7.1 G/DL (ref 6.4–8.2)
RBC # BLD AUTO: 3.41 MIL/UL (ref 4–5.2)
SODIUM SERPL-SCNC: 136 MMOL/L (ref 136–145)
WBC NRBC COR # BLD AUTO: 7.6 K/UL (ref 4.3–11)

## 2020-03-25 PROCEDURE — G0378 HOSPITAL OBSERVATION PER HR: HCPCS

## 2020-03-25 PROCEDURE — A6253 ABSORPT DRG > 48 SQ IN W/O B: HCPCS

## 2020-03-25 PROCEDURE — 5A1945Z RESPIRATORY VENTILATION, 24-96 CONSECUTIVE HOURS: ICD-10-PCS | Performed by: INTERNAL MEDICINE

## 2020-03-25 PROCEDURE — A4216 STERILE WATER/SALINE, 10 ML: HCPCS

## 2020-03-25 PROCEDURE — A6403 STERILE GAUZE>16 <= 48 SQ IN: HCPCS

## 2020-03-25 PROCEDURE — U0002 COVID-19 LAB TEST NON-CDC: HCPCS

## 2020-03-25 RX ADMIN — FAMOTIDINE SCH MG: 20 TABLET, FILM COATED ORAL at 22:42

## 2020-03-25 RX ADMIN — Medication SCH EACH: at 23:46

## 2020-03-25 RX ADMIN — INSULIN HUMAN PRN UNIT: 100 INJECTION, SOLUTION PARENTERAL at 23:47

## 2020-03-25 NOTE — NUR
RN ELIZABETH ADMISSION NOTES

RECEIVED PT FROM ER VIA [facility] ON VENT SETTING AS PER MD ORDER, NO SIGN AND SYMPTOMS OF 
DISTRESS O2 SAT @ 100% SAFELY TRANSFER FROM [facility] TO BED, V/S CHECKED WNL, WITH IV ON LHAND 
G20 WITH ONGOING METRONIDAZOLE RUNNING @ 100ML/HR INFUSING WELL PATENT NO SIGN AND SYMPTOMS 
OF INFILTRATION, HEAD TO TOE AND ADMISSION ASSESSMENT DONE, MULTIPLE SKIN TEARS NOTED FOR 
WOUND CONSULT, WITH G TUBE ON PLACE PATENT FLUSHED, HOOKED TO TELE MONITOR WITH CURRENT 
READING OF  SAFETY MEASURE OBSERVED BED ON LOWEST POSITION AND LOCKED SIDE RAILS UP X3 
CALL LIGHT WITHIN REACH WILL CONT TO MONITOR THE PT

## 2020-03-25 NOTE — NUR
BLAYNE FROM DIALYSIS CENTER C/O WEAKNESS AND LETHARGY.  DIALYSIS INCOMPLETE.  
TRACH AND GTUBE PRESENT, DIALYSIS ACCESS AT RIGHT UPPER CHEST.  PER 
ACCOMPANYING RT, PT IS AT BASELINE.  PT REPORTS BODY PAIN FROM HER CHEST DOWN 
HER BODY FOR MORE THAN A MONTH.  NO ACUTE DISTRESS NOTED.  RR EVEN AND 
UNLABORED.  ON MONITOR AND MADE COMFORTABLE.  SEEN BY DR GANDARA.  AWAITING ORDERS.

## 2020-03-26 VITALS — DIASTOLIC BLOOD PRESSURE: 69 MMHG | SYSTOLIC BLOOD PRESSURE: 139 MMHG

## 2020-03-26 VITALS — DIASTOLIC BLOOD PRESSURE: 85 MMHG | SYSTOLIC BLOOD PRESSURE: 152 MMHG

## 2020-03-26 VITALS — SYSTOLIC BLOOD PRESSURE: 139 MMHG | DIASTOLIC BLOOD PRESSURE: 69 MMHG

## 2020-03-26 VITALS — SYSTOLIC BLOOD PRESSURE: 164 MMHG | DIASTOLIC BLOOD PRESSURE: 67 MMHG

## 2020-03-26 VITALS — DIASTOLIC BLOOD PRESSURE: 63 MMHG | SYSTOLIC BLOOD PRESSURE: 149 MMHG

## 2020-03-26 VITALS — SYSTOLIC BLOOD PRESSURE: 150 MMHG | DIASTOLIC BLOOD PRESSURE: 68 MMHG

## 2020-03-26 VITALS — SYSTOLIC BLOOD PRESSURE: 138 MMHG | DIASTOLIC BLOOD PRESSURE: 70 MMHG

## 2020-03-26 LAB
BASOPHILS # BLD AUTO: 0 /CMM (ref 0–0.2)
BASOPHILS NFR BLD AUTO: 0.1 % (ref 0–2)
BUN SERPL-MCNC: 60 MG/DL (ref 7–18)
CALCIUM SERPL-MCNC: 9.7 MG/DL (ref 8.5–10.1)
CHLORIDE SERPL-SCNC: 99 MMOL/L (ref 98–107)
CHOLEST SERPL-MCNC: 102 MG/DL (ref ?–200)
CO2 SERPL-SCNC: 28 MMOL/L (ref 21–32)
CREAT SERPL-MCNC: 2.7 MG/DL (ref 0.6–1.3)
EOSINOPHIL NFR BLD AUTO: 1.2 % (ref 0–6)
FERRITIN SERPL-MCNC: 2118 NG/ML (ref 8–388)
GLUCOSE SERPL-MCNC: 84 MG/DL (ref 74–106)
HCT VFR BLD AUTO: 34 % (ref 33–45)
HDLC SERPL-MCNC: 53 MG/DL (ref 40–60)
HGB BLD-MCNC: 11 G/DL (ref 11.5–14.8)
IRON SERPL-MCNC: 61 UG/DL (ref 50–175)
LDLC SERPL DIRECT ASSAY-MCNC: 40 MG/DL (ref 0–99)
LYMPHOCYTES NFR BLD AUTO: 0.4 /CMM (ref 0.8–4.8)
LYMPHOCYTES NFR BLD AUTO: 4.3 % (ref 20–44)
MAGNESIUM SERPL-MCNC: 2.2 MG/DL (ref 1.8–2.4)
MCHC RBC AUTO-ENTMCNC: 32 G/DL (ref 31–36)
MCV RBC AUTO: 99 FL (ref 82–100)
MONOCYTES NFR BLD AUTO: 0.6 /CMM (ref 0.1–1.3)
MONOCYTES NFR BLD AUTO: 6.5 % (ref 2–12)
NEUTROPHILS # BLD AUTO: 7.9 /CMM (ref 1.8–8.9)
NEUTROPHILS NFR BLD AUTO: 87.9 % (ref 43–81)
PHOSPHATE SERPL-MCNC: 1.7 MG/DL (ref 2.5–4.9)
PLATELET # BLD AUTO: 141 /CMM (ref 150–450)
POTASSIUM SERPL-SCNC: 4 MMOL/L (ref 3.5–5.1)
RBC # BLD AUTO: 3.44 MIL/UL (ref 4–5.2)
SODIUM SERPL-SCNC: 134 MMOL/L (ref 136–145)
TIBC SERPL-MCNC: 61 UG/DL (ref 250–450)
TRIGL SERPL-MCNC: 34 MG/DL (ref 30–150)
TSH SERPL DL<=0.005 MIU/L-ACNC: 6.83 UIU/ML (ref 0.36–3.74)
WBC NRBC COR # BLD AUTO: 9 K/UL (ref 4.3–11)

## 2020-03-26 PROCEDURE — 5A1D70Z PERFORMANCE OF URINARY FILTRATION, INTERMITTENT, LESS THAN 6 HOURS PER DAY: ICD-10-PCS | Performed by: INTERNAL MEDICINE

## 2020-03-26 RX ADMIN — Medication SCH MG: at 14:19

## 2020-03-26 RX ADMIN — AMLODIPINE BESYLATE SCH MG: 10 TABLET ORAL at 08:22

## 2020-03-26 RX ADMIN — OXYCODONE HYDROCHLORIDE AND ACETAMINOPHEN PRN UDTAB: 5; 325 TABLET ORAL at 17:33

## 2020-03-26 RX ADMIN — ALBUTEROL SULFATE SCH MG: 2.5 SOLUTION RESPIRATORY (INHALATION) at 08:34

## 2020-03-26 RX ADMIN — Medication SCH EACH: at 17:24

## 2020-03-26 RX ADMIN — Medication SCH MG: at 08:20

## 2020-03-26 RX ADMIN — CHLORHEXIDINE GLUCONATE 0.12% ORAL RINSE SCH ML: 1.2 LIQUID ORAL at 21:49

## 2020-03-26 RX ADMIN — Medication SCH MLS/HR: at 05:34

## 2020-03-26 RX ADMIN — Medication SCH EACH: at 06:53

## 2020-03-26 RX ADMIN — ALBUTEROL SULFATE SCH MG: 2.5 SOLUTION RESPIRATORY (INHALATION) at 19:20

## 2020-03-26 RX ADMIN — LEVOTHYROXINE SODIUM SCH MCG: 175 TABLET ORAL at 07:49

## 2020-03-26 RX ADMIN — Medication SCH MG: at 08:34

## 2020-03-26 RX ADMIN — Medication SCH TAB: at 08:22

## 2020-03-26 RX ADMIN — Medication SCH MLS/HR: at 12:04

## 2020-03-26 RX ADMIN — ACETAMINOPHEN PRN MG: 325 TABLET ORAL at 17:36

## 2020-03-26 RX ADMIN — Medication SCH ML: at 12:04

## 2020-03-26 RX ADMIN — Medication SCH ML: at 16:18

## 2020-03-26 RX ADMIN — Medication SCH EACH: at 22:52

## 2020-03-26 RX ADMIN — ACETAMINOPHEN PRN MG: 325 TABLET ORAL at 03:48

## 2020-03-26 RX ADMIN — INSULIN HUMAN PRN UNIT: 100 INJECTION, SOLUTION PARENTERAL at 06:53

## 2020-03-26 RX ADMIN — LACTOBACILLUS TAB SCH EACH: TAB at 08:22

## 2020-03-26 RX ADMIN — ESCITALOPRAM OXALATE SCH MG: 10 TABLET, FILM COATED ORAL at 08:22

## 2020-03-26 RX ADMIN — MEROPENEM SCH MLS/HR: 500 INJECTION INTRAVENOUS at 21:49

## 2020-03-26 RX ADMIN — CHLORHEXIDINE GLUCONATE 0.12% ORAL RINSE SCH ML: 1.2 LIQUID ORAL at 08:20

## 2020-03-26 RX ADMIN — Medication SCH MG: at 19:20

## 2020-03-26 RX ADMIN — SODIUM PHOSPHATE, DIBASIC, ANHYDROUS, POTASSIUM PHOSPHATE, MONOBASIC, AND SODIUM PHOSPHATE, MONOBASIC, MONOHYDRATE SCH MG: 852; 155; 130 TABLET, COATED ORAL at 08:22

## 2020-03-26 RX ADMIN — INSULIN HUMAN PRN UNIT: 100 INJECTION, SOLUTION PARENTERAL at 22:55

## 2020-03-26 RX ADMIN — ARIPIPRAZOLE SCH MG: 5 TABLET ORAL at 08:22

## 2020-03-26 RX ADMIN — Medication SCH ML: at 08:23

## 2020-03-26 RX ADMIN — FAMOTIDINE SCH MG: 20 TABLET, FILM COATED ORAL at 21:49

## 2020-03-26 RX ADMIN — Medication SCH EACH: at 12:47

## 2020-03-26 RX ADMIN — INSULIN HUMAN PRN UNIT: 100 INJECTION, SOLUTION PARENTERAL at 17:36

## 2020-03-26 RX ADMIN — ALBUTEROL SULFATE SCH MG: 2.5 SOLUTION RESPIRATORY (INHALATION) at 14:19

## 2020-03-26 NOTE — NUR
TELE/RN OPENING NOTE



RECEIVED PATIENT A/OX2 PATIENT ON MECH VENT ABLE TO MOUTH WORDS, PATIENT DOES NOT SHOW ANY 
SIGN OF ANY DISTRESS. RESPIRATIONS EVEN AND UNLABORED.TOLERATING VENT SETTINGS AS ORDERED 
SPO2 @ 100%. PATIENT ON THE MONITOR SHOWS ST HR . PATIENT IS ON GTUBE FEEDING WITH 
NEPRO RUNNING AT 45CC/HR WITH NO RESIDUAL. PATIENT HAS RCW HD CATH INTACT AND LT HAND #22 
S/L FLUSHING AND PATENT. ALL SAFETY PRECAUTIONS HAVE BEEN APPLIED. WILL CONTINUE TO MONITOR 
PATIENT THROUGHOUT SHIFT.

## 2020-03-26 NOTE — NUR
RN ELIZABETH CLOSING NOTES



PT AWAKE IN BED, ALERT AND ORIENTED X 3, TOLERATING VENT SETTINGS WELL, NO SIGNS OF 
RESPIRATORY DISTRESS NOTED. ON TELE MONITOR SINUS RHYTHM. IV SITE ON LEFT HAND G20 INTACT, 
PATENT, SALINE LOCK IN PLACE. G-TUBE INTACT, PATENT, INFUSING NEPHRO AT 45CC/HR, TOLERATING 
FEEDING WELL. BED IN LOW POSITION, LOCKED, CALL LIGHT WITHIN REACH. DIALYSIS REMOVED 1L OF 
FLUID TODAY. FAMILY KEPT INFORMED OF PATIENT'S CONDITION.

## 2020-03-26 NOTE — NUR
RN ELIZABETH CLOSING NOTES

PT SLEEPING ON BED NO SIGNS OF ACUTE RESPIRATORY DISTRESS, NO SIGNIFICANT CHANGES ON 
CONDITION NOTED, STILL ON VENT SETTING AS PER MD, ON TELE MONITOR WITH CURRENT READING OF SR 
80'S ON DROPLET ISOLATION MAINTAINED, WITH PENDING LABS, SAFETY MEASURED MAINTAINED BED ON 
LOWEST POSSIBLE POSITION, SIDE RAILS UP X2, CALL LIGHT WITHIN REACH LATEST TEMP OF 99.2 WILL 
ENDORSE TO AM SHIFT NURSE FOR AJAY

## 2020-03-26 NOTE — NUR
RN ELIZABETH OPENING NOTES

PT SLEEPING IN BED, TOLERATING VENT SETTINGS WELL, NO SIGNS OF RESPIRATORY DISTRESS NOTED. 
ON TELE MONITOR SINUS RHYTHM. IV SITE ON LEFT HAND G20 INTACT, PATENT, SALINE LOCK IN PLACE. 
BED IN LOW POSITION, LOCKED, CALL LIGHT WITHIN REACH.WILL CONTINUE TO MONITOR.

## 2020-03-26 NOTE — NUR
RT NOTE



PT RCNELIDA JOHNSON'D ON MECHANICAL VENT WITH MD ORDERED SETTINGS. VENT PLUGGED INTO RED OUTLET. 
AMBU BAG/ BACKUP TRACH AT BEDSIDE. NO SOB NOTED AT THIS TIME. WILL CONTINUE TO MONITOR 
CLOSELY 

-------------------------------------------------------------------------------

Addendum: 03/26/20 at 1933 by ALYSE BUSTOS RT

-------------------------------------------------------------------------------

Amended: Links added.

## 2020-03-27 VITALS — SYSTOLIC BLOOD PRESSURE: 148 MMHG | DIASTOLIC BLOOD PRESSURE: 69 MMHG

## 2020-03-27 VITALS — DIASTOLIC BLOOD PRESSURE: 75 MMHG | SYSTOLIC BLOOD PRESSURE: 132 MMHG

## 2020-03-27 VITALS — SYSTOLIC BLOOD PRESSURE: 128 MMHG | DIASTOLIC BLOOD PRESSURE: 72 MMHG

## 2020-03-27 VITALS — DIASTOLIC BLOOD PRESSURE: 79 MMHG | SYSTOLIC BLOOD PRESSURE: 130 MMHG

## 2020-03-27 VITALS — SYSTOLIC BLOOD PRESSURE: 124 MMHG | DIASTOLIC BLOOD PRESSURE: 71 MMHG

## 2020-03-27 VITALS — DIASTOLIC BLOOD PRESSURE: 73 MMHG | SYSTOLIC BLOOD PRESSURE: 150 MMHG

## 2020-03-27 LAB
BUN SERPL-MCNC: 59 MG/DL (ref 7–18)
CALCIUM SERPL-MCNC: 9.9 MG/DL (ref 8.5–10.1)
CHLORIDE SERPL-SCNC: 100 MMOL/L (ref 98–107)
CO2 SERPL-SCNC: 27 MMOL/L (ref 21–32)
CREAT SERPL-MCNC: 2.6 MG/DL (ref 0.6–1.3)
GLUCOSE SERPL-MCNC: 206 MG/DL (ref 74–106)
PHOSPHATE SERPL-MCNC: 4 MG/DL (ref 2.5–4.9)
POTASSIUM SERPL-SCNC: 4.4 MMOL/L (ref 3.5–5.1)
SODIUM SERPL-SCNC: 139 MMOL/L (ref 136–145)

## 2020-03-27 RX ADMIN — ALBUTEROL SULFATE SCH MG: 2.5 SOLUTION RESPIRATORY (INHALATION) at 14:45

## 2020-03-27 RX ADMIN — Medication SCH EACH: at 18:11

## 2020-03-27 RX ADMIN — Medication SCH ML: at 08:10

## 2020-03-27 RX ADMIN — MEROPENEM SCH MLS/HR: 500 INJECTION INTRAVENOUS at 20:03

## 2020-03-27 RX ADMIN — Medication SCH MG: at 19:53

## 2020-03-27 RX ADMIN — ALBUTEROL SULFATE SCH MG: 2.5 SOLUTION RESPIRATORY (INHALATION) at 00:39

## 2020-03-27 RX ADMIN — ALBUTEROL SULFATE SCH MG: 2.5 SOLUTION RESPIRATORY (INHALATION) at 08:37

## 2020-03-27 RX ADMIN — Medication SCH EACH: at 11:30

## 2020-03-27 RX ADMIN — Medication SCH ML: at 17:17

## 2020-03-27 RX ADMIN — MEROPENEM SCH MLS/HR: 500 INJECTION INTRAVENOUS at 08:08

## 2020-03-27 RX ADMIN — Medication SCH EACH: at 21:12

## 2020-03-27 RX ADMIN — OXYCODONE HYDROCHLORIDE AND ACETAMINOPHEN PRN UDTAB: 5; 325 TABLET ORAL at 22:45

## 2020-03-27 RX ADMIN — Medication SCH EACH: at 07:49

## 2020-03-27 RX ADMIN — CHLORHEXIDINE GLUCONATE 0.12% ORAL RINSE SCH ML: 1.2 LIQUID ORAL at 08:06

## 2020-03-27 RX ADMIN — Medication SCH ML: at 12:19

## 2020-03-27 RX ADMIN — CLOTRIMAZOLE SCH APPLIC: 1 CREAM TOPICAL at 17:20

## 2020-03-27 RX ADMIN — Medication SCH MG: at 08:05

## 2020-03-27 RX ADMIN — Medication SCH MG: at 14:45

## 2020-03-27 RX ADMIN — ESCITALOPRAM OXALATE SCH MG: 10 TABLET, FILM COATED ORAL at 08:04

## 2020-03-27 RX ADMIN — Medication SCH MG: at 00:39

## 2020-03-27 RX ADMIN — LEVOTHYROXINE SODIUM SCH MCG: 175 TABLET ORAL at 07:37

## 2020-03-27 RX ADMIN — ALBUTEROL SULFATE SCH MG: 2.5 SOLUTION RESPIRATORY (INHALATION) at 19:53

## 2020-03-27 RX ADMIN — SODIUM PHOSPHATE, DIBASIC, ANHYDROUS, POTASSIUM PHOSPHATE, MONOBASIC, AND SODIUM PHOSPHATE, MONOBASIC, MONOHYDRATE SCH MG: 852; 155; 130 TABLET, COATED ORAL at 08:05

## 2020-03-27 RX ADMIN — CHLORHEXIDINE GLUCONATE 0.12% ORAL RINSE SCH ML: 1.2 LIQUID ORAL at 21:01

## 2020-03-27 RX ADMIN — FAMOTIDINE SCH MG: 20 TABLET, FILM COATED ORAL at 21:01

## 2020-03-27 RX ADMIN — INSULIN HUMAN PRN UNIT: 100 INJECTION, SOLUTION PARENTERAL at 07:50

## 2020-03-27 RX ADMIN — Medication SCH MG: at 08:37

## 2020-03-27 RX ADMIN — ACETAMINOPHEN PRN MG: 325 TABLET ORAL at 03:15

## 2020-03-27 RX ADMIN — CLOTRIMAZOLE SCH APPLIC: 1 CREAM TOPICAL at 11:34

## 2020-03-27 RX ADMIN — ARIPIPRAZOLE SCH MG: 5 TABLET ORAL at 08:04

## 2020-03-27 RX ADMIN — Medication SCH TAB: at 08:04

## 2020-03-27 RX ADMIN — LACTOBACILLUS TAB SCH EACH: TAB at 08:04

## 2020-03-27 RX ADMIN — AMLODIPINE BESYLATE SCH MG: 10 TABLET ORAL at 08:05

## 2020-03-27 NOTE — NUR
TELE/RN CLOSING NOTES



PATIENT IN BED WITH NO SIGN OF ANY DISTRESS. PATIENT TOLERATING VENT SETTINGS AS ORDERED. NO 
SIGN OF SOB. TOLERATING GTUBE FEEDING WITH NEPRO AT 45CC/HR. ALL SAFETY PRECAUTIONS APPLIED. 
ENDORSED PATIENT TO MORNING SHIFT NURSE FOR AJAY.

## 2020-03-27 NOTE — NUR
WOUND CARE CONSULT: PT PRESENTS WITH BREASTFOLD RASH AND OPEN SKIN, LEFT ABDOMINAL FOLD OPEN 
SKIN AND SACRAL SCARRING, PRESENT ON ADMISSION. RECOMMENDATIONS MADE FOR SKIN PROTECTION AND 
CARE. DISCUSSED WITH NURSING STAFF. FIRST STEP LOW AIRLOSS MATTRESS ON ORDER. CURRENT CHRISTINA 
SCORE IS 12. WILL SEE PRN. MD IN AGREEMENT WITH PLAN OF CARE. 

-------------------------------------------------------------------------------

Addendum: 03/27/20 at 0848 by BOB SARGENT WNDNU

-------------------------------------------------------------------------------

Amended: Links added.

## 2020-03-27 NOTE — NUR
RN OPENING NOTE



RECEIVED PT AWAKE AND ALERT IN BED IN SEMI RAY'S POSITION. PT ON MECHANICAL VENTILATOR 
AND TOLERATING WELL. NO INDICATIONS OF ACUTE DISTRESS. PT DENIES PAIN OR DISCOMFORT AT THIS 
TIME. PT ON NEPRO AT 45ML/HOUR VIA GT AND TOLERATING WELL. NO GASTRIC RESIDUAL NOTED. GT 
PATENT AND IN PLACE AND FLUSHING WELL. CALL LIGHT WITHIN REACH. ALL NEEDS MET AND ATTENDED 
TO. SAFETY MEASURES IN PLACE. WILL MONITOR.

## 2020-03-27 NOTE — NUR
TELE/RN NOTES



RECEIVED A PHONE CALL FROM ESTELA IN THE [facility] REGARDING THE VANCO MEDICATION TO BE GIVEN 
ONLY IF THE PATIENT GETS HD TODAY. PATIENT CONTINUES TO REMAIN IN STABLE CONDITION. WILL 
CONTINUE TO MONITOR CLOSELY.

## 2020-03-27 NOTE — NUR
TELE/RN CLOSING NOTES



PATIENT CONTINUES TO REMAIN IN STABLE CONDITION THROUGHOUT THE SHIFT. PROVIDED COMFORT AND 
SAFETY. PATIENT ABLE TO TOLERATE FEEDING AND MEDS WELL. HOB ELEVATED AT ALL TIME. IV ACCESS 
INTACT AND PATENT. FLUSHING WELL. NO S/S OF INFECTION OR INFILTRATION. GT INTACT AND PATENT 
AS WELL. NO PAIN OR ACUTE DISTRESS AT THIS TIME. RESPIRATION EVEN AND UNLABORED. SKIN IS DRY 
WARM TO TOUCH. ALL NEEDS ANTICIPATED. CALL LIGHT WITHIN REACHED. BED LOCKED AND IN LOWEST 
POSITION. SAFETY MAINTAINED. ENDORSED TO PM NURSE FOR AJAY.

## 2020-03-28 VITALS — SYSTOLIC BLOOD PRESSURE: 156 MMHG | DIASTOLIC BLOOD PRESSURE: 81 MMHG

## 2020-03-28 VITALS — DIASTOLIC BLOOD PRESSURE: 81 MMHG | SYSTOLIC BLOOD PRESSURE: 128 MMHG

## 2020-03-28 VITALS — DIASTOLIC BLOOD PRESSURE: 80 MMHG | SYSTOLIC BLOOD PRESSURE: 157 MMHG

## 2020-03-28 VITALS — DIASTOLIC BLOOD PRESSURE: 71 MMHG | SYSTOLIC BLOOD PRESSURE: 136 MMHG

## 2020-03-28 RX ADMIN — SODIUM PHOSPHATE, DIBASIC, ANHYDROUS, POTASSIUM PHOSPHATE, MONOBASIC, AND SODIUM PHOSPHATE, MONOBASIC, MONOHYDRATE SCH MG: 852; 155; 130 TABLET, COATED ORAL at 08:17

## 2020-03-28 RX ADMIN — CHLORHEXIDINE GLUCONATE 0.12% ORAL RINSE SCH ML: 1.2 LIQUID ORAL at 08:16

## 2020-03-28 RX ADMIN — ARIPIPRAZOLE SCH MG: 5 TABLET ORAL at 08:17

## 2020-03-28 RX ADMIN — Medication SCH ML: at 08:18

## 2020-03-28 RX ADMIN — INSULIN HUMAN PRN UNIT: 100 INJECTION, SOLUTION PARENTERAL at 08:01

## 2020-03-28 RX ADMIN — Medication SCH EACH: at 08:00

## 2020-03-28 RX ADMIN — AMLODIPINE BESYLATE SCH MG: 10 TABLET ORAL at 08:17

## 2020-03-28 RX ADMIN — LACTOBACILLUS TAB SCH EACH: TAB at 08:16

## 2020-03-28 RX ADMIN — ESCITALOPRAM OXALATE SCH MG: 10 TABLET, FILM COATED ORAL at 08:17

## 2020-03-28 RX ADMIN — Medication SCH MG: at 01:43

## 2020-03-28 RX ADMIN — MEROPENEM SCH MLS/HR: 500 INJECTION INTRAVENOUS at 08:20

## 2020-03-28 RX ADMIN — ALBUTEROL SULFATE SCH MG: 2.5 SOLUTION RESPIRATORY (INHALATION) at 13:59

## 2020-03-28 RX ADMIN — ALBUTEROL SULFATE SCH MG: 2.5 SOLUTION RESPIRATORY (INHALATION) at 01:43

## 2020-03-28 RX ADMIN — LEVOTHYROXINE SODIUM SCH MCG: 175 TABLET ORAL at 07:42

## 2020-03-28 RX ADMIN — ALBUTEROL SULFATE SCH MG: 2.5 SOLUTION RESPIRATORY (INHALATION) at 08:20

## 2020-03-28 RX ADMIN — Medication SCH EACH: at 12:46

## 2020-03-28 RX ADMIN — Medication SCH TAB: at 08:17

## 2020-03-28 RX ADMIN — CLOTRIMAZOLE SCH APPLIC: 1 CREAM TOPICAL at 08:21

## 2020-03-28 RX ADMIN — Medication SCH MG: at 08:17

## 2020-03-28 RX ADMIN — Medication SCH MG: at 13:59

## 2020-03-28 RX ADMIN — Medication SCH MG: at 08:20

## 2020-03-28 RX ADMIN — Medication SCH ML: at 12:47

## 2020-03-28 NOTE — NUR
TELE RN/CLOSING NOTE

PT AWAKE AND ALERT IN BED IN SEMI RAY'S POSITION. PT ON MECHANICAL VENTILATOR AND 
TOLERATING VENT SETTINGS WELL. NO INDICATIONS OF ACUTE DISTRESS. RESPIRATIONS EVEN AND 
UNLABORED. TRACH MID LINE AND IN PLACE. PT DENIES PAIN OR DISCOMFORT AT THIS TIME. PT ON 
NEPRO AT 45ML/HOUR VIA GT AND TOLERATING WELL. 10ML GASTRIC RESIDUAL NOTED. GT PATENT AND IN 
PLACE AND FLUSHING WELL. CALL LIGHT WITHIN REACH. ALL NEEDS MET AND ATTENDED TO. SAFETY 
MEASURES IN PLACE. WILL ENDORSE TO MORNING SHIFT FOR CONTINUATION OF CARE.

## 2020-03-28 NOTE — NUR
TELE/RN DISCHARGE NOTES



EMT ARRIVED AT THE UNIT TO  PATIENT. ALL DISCHARGE PAPERS WAS SIGNED AND GIVEN TO THE 
EMT AS WELL. REPORT WAS GIVEN TO EMT AND SLY GUPTA IN [place] [facility]. FINAL SKIN 
ASSESSMENT DONE. PHOTOS WAS TAKEN AND WAS PLACED IN THE CHART. PATIENT WAS SUCTIONED AS WELL 
PRIOR TO LEAVING THE UNIT. PATIENT WAS THEN TRANSFERRED TO THE [facility]. PATIENT LEFT THE 
HOSPITAL IN STABLE CONDITION.

## 2020-05-29 ENCOUNTER — HOSPITAL ENCOUNTER (EMERGENCY)
Dept: HOSPITAL 54 - ER | Age: 75
Discharge: NURSING, REHAB OR LONG-TERM CARE | End: 2020-05-29
Payer: MEDICARE

## 2020-05-29 VITALS — BODY MASS INDEX: 26.68 KG/M2 | HEIGHT: 62 IN | WEIGHT: 145 LBS

## 2020-05-29 VITALS — DIASTOLIC BLOOD PRESSURE: 56 MMHG | SYSTOLIC BLOOD PRESSURE: 110 MMHG

## 2020-05-29 DIAGNOSIS — Z99.2: ICD-10-CM

## 2020-05-29 DIAGNOSIS — Z90.89: ICD-10-CM

## 2020-05-29 DIAGNOSIS — I12.0: Primary | ICD-10-CM

## 2020-05-29 DIAGNOSIS — Z79.4: ICD-10-CM

## 2020-05-29 DIAGNOSIS — Z88.0: ICD-10-CM

## 2020-05-29 DIAGNOSIS — N18.6: ICD-10-CM

## 2020-05-29 DIAGNOSIS — Z79.899: ICD-10-CM

## 2020-05-29 DIAGNOSIS — F32.9: ICD-10-CM

## 2020-05-29 DIAGNOSIS — K21.9: ICD-10-CM

## 2020-05-29 DIAGNOSIS — Z88.2: ICD-10-CM

## 2020-05-29 DIAGNOSIS — E11.22: ICD-10-CM

## 2020-05-29 DIAGNOSIS — J44.9: ICD-10-CM

## 2020-05-29 LAB
ALBUMIN SERPL BCP-MCNC: 3 G/DL (ref 3.4–5)
ALP SERPL-CCNC: 139 U/L (ref 46–116)
ALT SERPL W P-5'-P-CCNC: 18 U/L (ref 12–78)
AST SERPL W P-5'-P-CCNC: 24 U/L (ref 15–37)
BASOPHILS # BLD AUTO: 0.1 /CMM (ref 0–0.2)
BASOPHILS NFR BLD AUTO: 0.5 % (ref 0–2)
BILIRUB DIRECT SERPL-MCNC: 0.1 MG/DL (ref 0–0.2)
BILIRUB SERPL-MCNC: 0.4 MG/DL (ref 0.2–1)
BUN SERPL-MCNC: 43 MG/DL (ref 7–18)
CALCIUM SERPL-MCNC: 10.2 MG/DL (ref 8.5–10.1)
CHLORIDE SERPL-SCNC: 97 MMOL/L (ref 98–107)
CO2 SERPL-SCNC: 29 MMOL/L (ref 21–32)
CREAT SERPL-MCNC: 2.8 MG/DL (ref 0.6–1.3)
EOSINOPHIL NFR BLD AUTO: 9.1 % (ref 0–6)
GLUCOSE SERPL-MCNC: 120 MG/DL (ref 74–106)
HCT VFR BLD AUTO: 35 % (ref 33–45)
HGB BLD-MCNC: 11.6 G/DL (ref 11.5–14.8)
LYMPHOCYTES NFR BLD AUTO: 1 /CMM (ref 0.8–4.8)
LYMPHOCYTES NFR BLD AUTO: 8.3 % (ref 20–44)
MCHC RBC AUTO-ENTMCNC: 33 G/DL (ref 31–36)
MCV RBC AUTO: 104 FL (ref 82–100)
MONOCYTES NFR BLD AUTO: 0.6 /CMM (ref 0.1–1.3)
MONOCYTES NFR BLD AUTO: 5.2 % (ref 2–12)
NEUTROPHILS # BLD AUTO: 8.9 /CMM (ref 1.8–8.9)
NEUTROPHILS NFR BLD AUTO: 76.9 % (ref 43–81)
PLATELET # BLD AUTO: 151 /CMM (ref 150–450)
POTASSIUM SERPL-SCNC: 3.2 MMOL/L (ref 3.5–5.1)
PROT SERPL-MCNC: 8.3 G/DL (ref 6.4–8.2)
RBC # BLD AUTO: 3.4 MIL/UL (ref 4–5.2)
SODIUM SERPL-SCNC: 133 MMOL/L (ref 136–145)
WBC NRBC COR # BLD AUTO: 11.5 K/UL (ref 4.3–11)

## 2020-11-04 ENCOUNTER — HOSPITAL ENCOUNTER (INPATIENT)
Dept: HOSPITAL 54 - ER | Age: 75
LOS: 2 days | Discharge: ANOTHER HOSPITAL | DRG: 208 | End: 2020-11-06
Attending: FAMILY MEDICINE | Admitting: NURSE PRACTITIONER
Payer: MEDICARE

## 2020-11-04 VITALS — DIASTOLIC BLOOD PRESSURE: 51 MMHG | SYSTOLIC BLOOD PRESSURE: 95 MMHG

## 2020-11-04 VITALS — DIASTOLIC BLOOD PRESSURE: 49 MMHG | SYSTOLIC BLOOD PRESSURE: 119 MMHG

## 2020-11-04 VITALS — DIASTOLIC BLOOD PRESSURE: 96 MMHG | SYSTOLIC BLOOD PRESSURE: 140 MMHG

## 2020-11-04 VITALS — SYSTOLIC BLOOD PRESSURE: 117 MMHG | DIASTOLIC BLOOD PRESSURE: 46 MMHG

## 2020-11-04 VITALS — DIASTOLIC BLOOD PRESSURE: 95 MMHG | SYSTOLIC BLOOD PRESSURE: 151 MMHG

## 2020-11-04 VITALS — SYSTOLIC BLOOD PRESSURE: 65 MMHG | DIASTOLIC BLOOD PRESSURE: 37 MMHG

## 2020-11-04 VITALS — SYSTOLIC BLOOD PRESSURE: 115 MMHG | DIASTOLIC BLOOD PRESSURE: 41 MMHG

## 2020-11-04 VITALS — SYSTOLIC BLOOD PRESSURE: 102 MMHG | DIASTOLIC BLOOD PRESSURE: 48 MMHG

## 2020-11-04 VITALS — DIASTOLIC BLOOD PRESSURE: 107 MMHG | SYSTOLIC BLOOD PRESSURE: 141 MMHG

## 2020-11-04 VITALS — DIASTOLIC BLOOD PRESSURE: 46 MMHG | SYSTOLIC BLOOD PRESSURE: 101 MMHG

## 2020-11-04 VITALS — SYSTOLIC BLOOD PRESSURE: 127 MMHG | DIASTOLIC BLOOD PRESSURE: 31 MMHG

## 2020-11-04 VITALS — DIASTOLIC BLOOD PRESSURE: 41 MMHG | SYSTOLIC BLOOD PRESSURE: 110 MMHG

## 2020-11-04 VITALS — SYSTOLIC BLOOD PRESSURE: 94 MMHG | DIASTOLIC BLOOD PRESSURE: 54 MMHG

## 2020-11-04 VITALS — SYSTOLIC BLOOD PRESSURE: 133 MMHG | DIASTOLIC BLOOD PRESSURE: 45 MMHG

## 2020-11-04 VITALS — DIASTOLIC BLOOD PRESSURE: 50 MMHG | SYSTOLIC BLOOD PRESSURE: 114 MMHG

## 2020-11-04 VITALS — DIASTOLIC BLOOD PRESSURE: 29 MMHG | SYSTOLIC BLOOD PRESSURE: 91 MMHG

## 2020-11-04 VITALS — SYSTOLIC BLOOD PRESSURE: 72 MMHG | DIASTOLIC BLOOD PRESSURE: 42 MMHG

## 2020-11-04 VITALS — SYSTOLIC BLOOD PRESSURE: 109 MMHG | DIASTOLIC BLOOD PRESSURE: 63 MMHG

## 2020-11-04 VITALS — WEIGHT: 161 LBS | HEIGHT: 62 IN | BODY MASS INDEX: 29.63 KG/M2

## 2020-11-04 VITALS — DIASTOLIC BLOOD PRESSURE: 72 MMHG | SYSTOLIC BLOOD PRESSURE: 110 MMHG

## 2020-11-04 DIAGNOSIS — N18.6: ICD-10-CM

## 2020-11-04 DIAGNOSIS — E03.9: ICD-10-CM

## 2020-11-04 DIAGNOSIS — I95.9: ICD-10-CM

## 2020-11-04 DIAGNOSIS — I12.0: ICD-10-CM

## 2020-11-04 DIAGNOSIS — Z99.11: ICD-10-CM

## 2020-11-04 DIAGNOSIS — Z79.51: ICD-10-CM

## 2020-11-04 DIAGNOSIS — L89.626: ICD-10-CM

## 2020-11-04 DIAGNOSIS — Z90.49: ICD-10-CM

## 2020-11-04 DIAGNOSIS — F32.9: ICD-10-CM

## 2020-11-04 DIAGNOSIS — Z93.1: ICD-10-CM

## 2020-11-04 DIAGNOSIS — Z85.42: ICD-10-CM

## 2020-11-04 DIAGNOSIS — G93.41: ICD-10-CM

## 2020-11-04 DIAGNOSIS — D63.8: ICD-10-CM

## 2020-11-04 DIAGNOSIS — L89.616: ICD-10-CM

## 2020-11-04 DIAGNOSIS — Z79.899: ICD-10-CM

## 2020-11-04 DIAGNOSIS — Z79.4: ICD-10-CM

## 2020-11-04 DIAGNOSIS — Z93.0: ICD-10-CM

## 2020-11-04 DIAGNOSIS — M62.561: ICD-10-CM

## 2020-11-04 DIAGNOSIS — Z88.0: ICD-10-CM

## 2020-11-04 DIAGNOSIS — J81.0: Primary | ICD-10-CM

## 2020-11-04 DIAGNOSIS — K21.9: ICD-10-CM

## 2020-11-04 DIAGNOSIS — E43: ICD-10-CM

## 2020-11-04 DIAGNOSIS — J18.9: ICD-10-CM

## 2020-11-04 DIAGNOSIS — Z74.01: ICD-10-CM

## 2020-11-04 DIAGNOSIS — E27.40: ICD-10-CM

## 2020-11-04 DIAGNOSIS — J96.10: ICD-10-CM

## 2020-11-04 DIAGNOSIS — E11.22: ICD-10-CM

## 2020-11-04 DIAGNOSIS — Z88.2: ICD-10-CM

## 2020-11-04 DIAGNOSIS — D68.59: ICD-10-CM

## 2020-11-04 DIAGNOSIS — F39: ICD-10-CM

## 2020-11-04 DIAGNOSIS — Z99.2: ICD-10-CM

## 2020-11-04 DIAGNOSIS — E87.1: ICD-10-CM

## 2020-11-04 DIAGNOSIS — R53.2: ICD-10-CM

## 2020-11-04 DIAGNOSIS — E11.65: ICD-10-CM

## 2020-11-04 DIAGNOSIS — D69.6: ICD-10-CM

## 2020-11-04 DIAGNOSIS — M62.562: ICD-10-CM

## 2020-11-04 DIAGNOSIS — R13.10: ICD-10-CM

## 2020-11-04 LAB
ALBUMIN SERPL BCP-MCNC: 2 G/DL (ref 3.4–5)
ALP SERPL-CCNC: 192 U/L (ref 46–116)
ALT SERPL W P-5'-P-CCNC: 13 U/L (ref 12–78)
AST SERPL W P-5'-P-CCNC: 26 U/L (ref 15–37)
BASOPHILS # BLD AUTO: 0 /CMM (ref 0–0.2)
BASOPHILS NFR BLD AUTO: 0.5 % (ref 0–2)
BILIRUB DIRECT SERPL-MCNC: 0.2 MG/DL (ref 0–0.2)
BILIRUB SERPL-MCNC: 0.3 MG/DL (ref 0.2–1)
BUN SERPL-MCNC: 47 MG/DL (ref 7–18)
CALCIUM SERPL-MCNC: 9.3 MG/DL (ref 8.5–10.1)
CHLORIDE SERPL-SCNC: 95 MMOL/L (ref 98–107)
CO2 SERPL-SCNC: 29 MMOL/L (ref 21–32)
CREAT SERPL-MCNC: 2.6 MG/DL (ref 0.6–1.3)
EOSINOPHIL NFR BLD AUTO: 4.7 % (ref 0–6)
EOSINOPHIL NFR BLD MANUAL: 5 % (ref 0–4)
GLUCOSE SERPL-MCNC: 138 MG/DL (ref 74–106)
HCT VFR BLD AUTO: 28 % (ref 33–45)
HGB BLD-MCNC: 8.3 G/DL (ref 11.5–14.8)
LIPASE SERPL-CCNC: 47 U/L (ref 73–393)
LYMPHOCYTES NFR BLD AUTO: 0.4 /CMM (ref 0.8–4.8)
LYMPHOCYTES NFR BLD AUTO: 4.3 % (ref 20–44)
LYMPHOCYTES NFR BLD MANUAL: 5 % (ref 16–48)
MAGNESIUM SERPL-MCNC: 2.4 MG/DL (ref 1.8–2.4)
MCHC RBC AUTO-ENTMCNC: 30 G/DL (ref 31–36)
MCV RBC AUTO: 110 FL (ref 82–100)
MONOCYTES NFR BLD AUTO: 0.2 /CMM (ref 0.1–1.3)
MONOCYTES NFR BLD AUTO: 2.3 % (ref 2–12)
MONOCYTES NFR BLD MANUAL: 3 % (ref 0–11)
NEUTROPHILS # BLD AUTO: 9.1 /CMM (ref 1.8–8.9)
NEUTROPHILS NFR BLD AUTO: 88.2 % (ref 43–81)
NEUTS BAND NFR BLD MANUAL: 9 % (ref 0–5)
NEUTS SEG NFR BLD MANUAL: 78 % (ref 42–76)
PHOSPHATE SERPL-MCNC: 5.6 MG/DL (ref 2.5–4.9)
PLATELET # BLD AUTO: 86 /CMM (ref 150–450)
POTASSIUM SERPL-SCNC: 4 MMOL/L (ref 3.5–5.1)
PROT SERPL-MCNC: 7.8 G/DL (ref 6.4–8.2)
RBC # BLD AUTO: 2.53 MIL/UL (ref 4–5.2)
SODIUM SERPL-SCNC: 128 MMOL/L (ref 136–145)
WBC NRBC COR # BLD AUTO: 10.3 K/UL (ref 4.3–11)

## 2020-11-04 PROCEDURE — U0003 INFECTIOUS AGENT DETECTION BY NUCLEIC ACID (DNA OR RNA); SEVERE ACUTE RESPIRATORY SYNDROME CORONAVIRUS 2 (SARS-COV-2) (CORONAVIRUS DISEASE [COVID-19]), AMPLIFIED PROBE TECHNIQUE, MAKING USE OF HIGH THROUGHPUT TECHNOLOGIES AS DESCRIBED BY CMS-2020-01-R: HCPCS

## 2020-11-04 PROCEDURE — 5A1945Z RESPIRATORY VENTILATION, 24-96 CONSECUTIVE HOURS: ICD-10-PCS | Performed by: INTERNAL MEDICINE

## 2020-11-04 PROCEDURE — A6253 ABSORPT DRG > 48 SQ IN W/O B: HCPCS

## 2020-11-04 PROCEDURE — A6403 STERILE GAUZE>16 <= 48 SQ IN: HCPCS

## 2020-11-04 PROCEDURE — G0378 HOSPITAL OBSERVATION PER HR: HCPCS

## 2020-11-04 PROCEDURE — 02HV33Z INSERTION OF INFUSION DEVICE INTO SUPERIOR VENA CAVA, PERCUTANEOUS APPROACH: ICD-10-PCS | Performed by: NURSE PRACTITIONER

## 2020-11-04 PROCEDURE — B548ZZA ULTRASONOGRAPHY OF SUPERIOR VENA CAVA, GUIDANCE: ICD-10-PCS | Performed by: NURSE PRACTITIONER

## 2020-11-04 PROCEDURE — C1751 CATH, INF, PER/CENT/MIDLINE: HCPCS

## 2020-11-04 NOTE — NUR
RN NOTE

RECEIVED PT IN BED IN SEMI FOWLERS POSITION, OPENS EYES, IS NONVERBAL. TOLERATING CURRENT 
VENT SETTINGS. SINUS SHELDON ON THE MONITOR.. PT IS ANURIC. IV TO LEFT HAND PATENT INTACT AND 
FLUSHING WELL. HD CATH TO LEFT CHEST CLEAN AND INTACT. GT CLAMPED. PT BP STABLE AT THIS 
TIME. BED LOCKED AND IN LOWEST POSITION, SIDE RAILS UP X3 WILL CONTINUE TO MONITOR PT.

## 2020-11-04 NOTE — NUR
ICU/RN: RECEIVED PT FROM ER. PT TRANSFERRED TO BED. TRACH TO VENT WITH SETTINGS AS ORDERED. 
VSS, SINUS SHELDON ON TELE, DOES NOT FOLLOW COMMANDS. GENERALIZED EDEMA NOTED. PIV PATENT, 
LEFT CHEST WALL HD CATH IN PLACE. REPORT ENDORSED TO ONCOMING NURSE TO COMPLETE ADMISSION. 
BED IN LOW POSITION, SIDE RAILS UP, CALL LIGHT WITHIN REACH.

## 2020-11-04 NOTE — NUR
RN NOTE

SON BK CALLED REQUESTIND AND ARRANGING MOTHER TO BE TRANSFERED TO [facility]. INFORMED SON 
PATIENT STARTED ON LEVO AND PICC LINE STARTED. STATES WILL CALL IN THE MORNING TO GIVE 
UPDATE ON BED STATUS.

## 2020-11-04 NOTE — NUR
SON CALLED HERE & STS THAT IT MAY TAKE A WHILE FOR [facility] TO HAVE A BED. 
SON REQUESTING TO BE ADMITTED HERE. DR. MATTHEW AWARE.

## 2020-11-04 NOTE — NUR
DR MATTHEW SAID THAT [facility] HAS NO BED AND WANT PATIENT ADMITTED AND SENT TO 
THE UNIT TO GET DIALYZED SINCE THE LONGER THE PATIENT STAYS HERE THE SICKER SHE 
WILL GET,HOSPITALIST JEAN-CLAUDE CALLED FOR DR MATTHEW

## 2020-11-04 NOTE — NUR
Pt admitted from dialysis for hypotension. Pt received trach'd via InstantLuxe # 6 XLT. Settings 
given by transport RT. Pt placed on vent on given settings. Alarms set and audible. Vent 
plugged into red outlet w bmv @ hob. No respiratory distress noted @ this time.

## 2020-11-04 NOTE — NUR
Pt transferred to . Alarms set and audible. Vent plugged into red outlet w bmv @ hob. 
No respiratory distress noted @ this time.

## 2020-11-05 VITALS — DIASTOLIC BLOOD PRESSURE: 44 MMHG | SYSTOLIC BLOOD PRESSURE: 142 MMHG

## 2020-11-05 VITALS — DIASTOLIC BLOOD PRESSURE: 41 MMHG | SYSTOLIC BLOOD PRESSURE: 95 MMHG

## 2020-11-05 VITALS — DIASTOLIC BLOOD PRESSURE: 40 MMHG | SYSTOLIC BLOOD PRESSURE: 86 MMHG

## 2020-11-05 VITALS — DIASTOLIC BLOOD PRESSURE: 33 MMHG | SYSTOLIC BLOOD PRESSURE: 86 MMHG

## 2020-11-05 VITALS — SYSTOLIC BLOOD PRESSURE: 97 MMHG | DIASTOLIC BLOOD PRESSURE: 72 MMHG

## 2020-11-05 VITALS — DIASTOLIC BLOOD PRESSURE: 48 MMHG | SYSTOLIC BLOOD PRESSURE: 110 MMHG

## 2020-11-05 VITALS — SYSTOLIC BLOOD PRESSURE: 107 MMHG | DIASTOLIC BLOOD PRESSURE: 45 MMHG

## 2020-11-05 VITALS — DIASTOLIC BLOOD PRESSURE: 44 MMHG | SYSTOLIC BLOOD PRESSURE: 120 MMHG

## 2020-11-05 VITALS — SYSTOLIC BLOOD PRESSURE: 125 MMHG | DIASTOLIC BLOOD PRESSURE: 60 MMHG

## 2020-11-05 VITALS — DIASTOLIC BLOOD PRESSURE: 33 MMHG | SYSTOLIC BLOOD PRESSURE: 102 MMHG

## 2020-11-05 VITALS — DIASTOLIC BLOOD PRESSURE: 69 MMHG | SYSTOLIC BLOOD PRESSURE: 127 MMHG

## 2020-11-05 VITALS — DIASTOLIC BLOOD PRESSURE: 38 MMHG | SYSTOLIC BLOOD PRESSURE: 90 MMHG

## 2020-11-05 VITALS — DIASTOLIC BLOOD PRESSURE: 23 MMHG | SYSTOLIC BLOOD PRESSURE: 62 MMHG

## 2020-11-05 VITALS — SYSTOLIC BLOOD PRESSURE: 136 MMHG | DIASTOLIC BLOOD PRESSURE: 53 MMHG

## 2020-11-05 VITALS — SYSTOLIC BLOOD PRESSURE: 118 MMHG | DIASTOLIC BLOOD PRESSURE: 36 MMHG

## 2020-11-05 VITALS — DIASTOLIC BLOOD PRESSURE: 47 MMHG | SYSTOLIC BLOOD PRESSURE: 113 MMHG

## 2020-11-05 VITALS — DIASTOLIC BLOOD PRESSURE: 49 MMHG | SYSTOLIC BLOOD PRESSURE: 115 MMHG

## 2020-11-05 VITALS — SYSTOLIC BLOOD PRESSURE: 114 MMHG | DIASTOLIC BLOOD PRESSURE: 47 MMHG

## 2020-11-05 VITALS — DIASTOLIC BLOOD PRESSURE: 51 MMHG | SYSTOLIC BLOOD PRESSURE: 89 MMHG

## 2020-11-05 VITALS — SYSTOLIC BLOOD PRESSURE: 141 MMHG | DIASTOLIC BLOOD PRESSURE: 61 MMHG

## 2020-11-05 VITALS — SYSTOLIC BLOOD PRESSURE: 153 MMHG | DIASTOLIC BLOOD PRESSURE: 58 MMHG

## 2020-11-05 VITALS — DIASTOLIC BLOOD PRESSURE: 61 MMHG | SYSTOLIC BLOOD PRESSURE: 135 MMHG

## 2020-11-05 VITALS — DIASTOLIC BLOOD PRESSURE: 59 MMHG | SYSTOLIC BLOOD PRESSURE: 162 MMHG

## 2020-11-05 VITALS — DIASTOLIC BLOOD PRESSURE: 65 MMHG | SYSTOLIC BLOOD PRESSURE: 160 MMHG

## 2020-11-05 VITALS — DIASTOLIC BLOOD PRESSURE: 56 MMHG | SYSTOLIC BLOOD PRESSURE: 145 MMHG

## 2020-11-05 VITALS — SYSTOLIC BLOOD PRESSURE: 107 MMHG | DIASTOLIC BLOOD PRESSURE: 43 MMHG

## 2020-11-05 VITALS — SYSTOLIC BLOOD PRESSURE: 96 MMHG | DIASTOLIC BLOOD PRESSURE: 38 MMHG

## 2020-11-05 VITALS — DIASTOLIC BLOOD PRESSURE: 38 MMHG | SYSTOLIC BLOOD PRESSURE: 100 MMHG

## 2020-11-05 VITALS — DIASTOLIC BLOOD PRESSURE: 46 MMHG | SYSTOLIC BLOOD PRESSURE: 107 MMHG

## 2020-11-05 VITALS — SYSTOLIC BLOOD PRESSURE: 128 MMHG | DIASTOLIC BLOOD PRESSURE: 43 MMHG

## 2020-11-05 VITALS — DIASTOLIC BLOOD PRESSURE: 69 MMHG | SYSTOLIC BLOOD PRESSURE: 109 MMHG

## 2020-11-05 VITALS — SYSTOLIC BLOOD PRESSURE: 99 MMHG | DIASTOLIC BLOOD PRESSURE: 45 MMHG

## 2020-11-05 VITALS — DIASTOLIC BLOOD PRESSURE: 42 MMHG | SYSTOLIC BLOOD PRESSURE: 131 MMHG

## 2020-11-05 VITALS — DIASTOLIC BLOOD PRESSURE: 47 MMHG | SYSTOLIC BLOOD PRESSURE: 119 MMHG

## 2020-11-05 VITALS — SYSTOLIC BLOOD PRESSURE: 105 MMHG | DIASTOLIC BLOOD PRESSURE: 46 MMHG

## 2020-11-05 VITALS — DIASTOLIC BLOOD PRESSURE: 29 MMHG | SYSTOLIC BLOOD PRESSURE: 96 MMHG

## 2020-11-05 VITALS — DIASTOLIC BLOOD PRESSURE: 47 MMHG | SYSTOLIC BLOOD PRESSURE: 96 MMHG

## 2020-11-05 VITALS — DIASTOLIC BLOOD PRESSURE: 55 MMHG | SYSTOLIC BLOOD PRESSURE: 101 MMHG

## 2020-11-05 VITALS — SYSTOLIC BLOOD PRESSURE: 114 MMHG | DIASTOLIC BLOOD PRESSURE: 45 MMHG

## 2020-11-05 VITALS — DIASTOLIC BLOOD PRESSURE: 40 MMHG | SYSTOLIC BLOOD PRESSURE: 94 MMHG

## 2020-11-05 VITALS — DIASTOLIC BLOOD PRESSURE: 23 MMHG | SYSTOLIC BLOOD PRESSURE: 113 MMHG

## 2020-11-05 VITALS — DIASTOLIC BLOOD PRESSURE: 45 MMHG | SYSTOLIC BLOOD PRESSURE: 107 MMHG

## 2020-11-05 VITALS — DIASTOLIC BLOOD PRESSURE: 30 MMHG | SYSTOLIC BLOOD PRESSURE: 73 MMHG

## 2020-11-05 VITALS — DIASTOLIC BLOOD PRESSURE: 53 MMHG | SYSTOLIC BLOOD PRESSURE: 116 MMHG

## 2020-11-05 VITALS — SYSTOLIC BLOOD PRESSURE: 116 MMHG | DIASTOLIC BLOOD PRESSURE: 42 MMHG

## 2020-11-05 VITALS — SYSTOLIC BLOOD PRESSURE: 158 MMHG | DIASTOLIC BLOOD PRESSURE: 68 MMHG

## 2020-11-05 VITALS — SYSTOLIC BLOOD PRESSURE: 145 MMHG | DIASTOLIC BLOOD PRESSURE: 90 MMHG

## 2020-11-05 VITALS — DIASTOLIC BLOOD PRESSURE: 59 MMHG | SYSTOLIC BLOOD PRESSURE: 126 MMHG

## 2020-11-05 VITALS — DIASTOLIC BLOOD PRESSURE: 40 MMHG | SYSTOLIC BLOOD PRESSURE: 98 MMHG

## 2020-11-05 VITALS — DIASTOLIC BLOOD PRESSURE: 41 MMHG | SYSTOLIC BLOOD PRESSURE: 98 MMHG

## 2020-11-05 VITALS — SYSTOLIC BLOOD PRESSURE: 113 MMHG | DIASTOLIC BLOOD PRESSURE: 44 MMHG

## 2020-11-05 VITALS — DIASTOLIC BLOOD PRESSURE: 43 MMHG | SYSTOLIC BLOOD PRESSURE: 91 MMHG

## 2020-11-05 VITALS — DIASTOLIC BLOOD PRESSURE: 59 MMHG | SYSTOLIC BLOOD PRESSURE: 125 MMHG

## 2020-11-05 VITALS — DIASTOLIC BLOOD PRESSURE: 44 MMHG | SYSTOLIC BLOOD PRESSURE: 110 MMHG

## 2020-11-05 VITALS — SYSTOLIC BLOOD PRESSURE: 102 MMHG | DIASTOLIC BLOOD PRESSURE: 44 MMHG

## 2020-11-05 VITALS — SYSTOLIC BLOOD PRESSURE: 123 MMHG | DIASTOLIC BLOOD PRESSURE: 50 MMHG

## 2020-11-05 VITALS — DIASTOLIC BLOOD PRESSURE: 53 MMHG | SYSTOLIC BLOOD PRESSURE: 108 MMHG

## 2020-11-05 VITALS — SYSTOLIC BLOOD PRESSURE: 132 MMHG | DIASTOLIC BLOOD PRESSURE: 47 MMHG

## 2020-11-05 VITALS — DIASTOLIC BLOOD PRESSURE: 45 MMHG | SYSTOLIC BLOOD PRESSURE: 103 MMHG

## 2020-11-05 VITALS — DIASTOLIC BLOOD PRESSURE: 54 MMHG | SYSTOLIC BLOOD PRESSURE: 134 MMHG

## 2020-11-05 VITALS — DIASTOLIC BLOOD PRESSURE: 38 MMHG | SYSTOLIC BLOOD PRESSURE: 118 MMHG

## 2020-11-05 VITALS — SYSTOLIC BLOOD PRESSURE: 107 MMHG | DIASTOLIC BLOOD PRESSURE: 72 MMHG

## 2020-11-05 VITALS — SYSTOLIC BLOOD PRESSURE: 126 MMHG | DIASTOLIC BLOOD PRESSURE: 51 MMHG

## 2020-11-05 VITALS — SYSTOLIC BLOOD PRESSURE: 121 MMHG | DIASTOLIC BLOOD PRESSURE: 52 MMHG

## 2020-11-05 VITALS — SYSTOLIC BLOOD PRESSURE: 150 MMHG | DIASTOLIC BLOOD PRESSURE: 98 MMHG

## 2020-11-05 VITALS — DIASTOLIC BLOOD PRESSURE: 35 MMHG | SYSTOLIC BLOOD PRESSURE: 98 MMHG

## 2020-11-05 VITALS — DIASTOLIC BLOOD PRESSURE: 50 MMHG | SYSTOLIC BLOOD PRESSURE: 112 MMHG

## 2020-11-05 VITALS — DIASTOLIC BLOOD PRESSURE: 45 MMHG | SYSTOLIC BLOOD PRESSURE: 115 MMHG

## 2020-11-05 VITALS — SYSTOLIC BLOOD PRESSURE: 150 MMHG | DIASTOLIC BLOOD PRESSURE: 57 MMHG

## 2020-11-05 VITALS — DIASTOLIC BLOOD PRESSURE: 69 MMHG | SYSTOLIC BLOOD PRESSURE: 100 MMHG

## 2020-11-05 VITALS — SYSTOLIC BLOOD PRESSURE: 74 MMHG | DIASTOLIC BLOOD PRESSURE: 38 MMHG

## 2020-11-05 VITALS — DIASTOLIC BLOOD PRESSURE: 61 MMHG | SYSTOLIC BLOOD PRESSURE: 134 MMHG

## 2020-11-05 VITALS — SYSTOLIC BLOOD PRESSURE: 105 MMHG | DIASTOLIC BLOOD PRESSURE: 59 MMHG

## 2020-11-05 VITALS — DIASTOLIC BLOOD PRESSURE: 64 MMHG | SYSTOLIC BLOOD PRESSURE: 146 MMHG

## 2020-11-05 VITALS — DIASTOLIC BLOOD PRESSURE: 52 MMHG | SYSTOLIC BLOOD PRESSURE: 130 MMHG

## 2020-11-05 VITALS — DIASTOLIC BLOOD PRESSURE: 53 MMHG | SYSTOLIC BLOOD PRESSURE: 109 MMHG

## 2020-11-05 VITALS — DIASTOLIC BLOOD PRESSURE: 86 MMHG | SYSTOLIC BLOOD PRESSURE: 116 MMHG

## 2020-11-05 VITALS — SYSTOLIC BLOOD PRESSURE: 94 MMHG | DIASTOLIC BLOOD PRESSURE: 41 MMHG

## 2020-11-05 VITALS — SYSTOLIC BLOOD PRESSURE: 111 MMHG | DIASTOLIC BLOOD PRESSURE: 50 MMHG

## 2020-11-05 VITALS — SYSTOLIC BLOOD PRESSURE: 90 MMHG | DIASTOLIC BLOOD PRESSURE: 32 MMHG

## 2020-11-05 VITALS — DIASTOLIC BLOOD PRESSURE: 49 MMHG | SYSTOLIC BLOOD PRESSURE: 93 MMHG

## 2020-11-05 VITALS — DIASTOLIC BLOOD PRESSURE: 43 MMHG | SYSTOLIC BLOOD PRESSURE: 92 MMHG

## 2020-11-05 VITALS — SYSTOLIC BLOOD PRESSURE: 123 MMHG | DIASTOLIC BLOOD PRESSURE: 42 MMHG

## 2020-11-05 VITALS — DIASTOLIC BLOOD PRESSURE: 51 MMHG | SYSTOLIC BLOOD PRESSURE: 116 MMHG

## 2020-11-05 VITALS — SYSTOLIC BLOOD PRESSURE: 85 MMHG | DIASTOLIC BLOOD PRESSURE: 40 MMHG

## 2020-11-05 VITALS — SYSTOLIC BLOOD PRESSURE: 127 MMHG | DIASTOLIC BLOOD PRESSURE: 61 MMHG

## 2020-11-05 VITALS — DIASTOLIC BLOOD PRESSURE: 46 MMHG | SYSTOLIC BLOOD PRESSURE: 103 MMHG

## 2020-11-05 VITALS — DIASTOLIC BLOOD PRESSURE: 49 MMHG | SYSTOLIC BLOOD PRESSURE: 89 MMHG

## 2020-11-05 VITALS — SYSTOLIC BLOOD PRESSURE: 88 MMHG | DIASTOLIC BLOOD PRESSURE: 54 MMHG

## 2020-11-05 VITALS — DIASTOLIC BLOOD PRESSURE: 66 MMHG | SYSTOLIC BLOOD PRESSURE: 106 MMHG

## 2020-11-05 LAB
BASE EXCESS BLDA CALC-SCNC: -2.9 MMOL/L
BASOPHILS # BLD AUTO: 0 /CMM (ref 0–0.2)
BASOPHILS NFR BLD AUTO: 0.4 % (ref 0–2)
BUN SERPL-MCNC: 56 MG/DL (ref 7–18)
CALCIUM SERPL-MCNC: 9 MG/DL (ref 8.5–10.1)
CHLORIDE SERPL-SCNC: 97 MMOL/L (ref 98–107)
CHOLEST SERPL-MCNC: 48 MG/DL (ref ?–200)
CO2 SERPL-SCNC: 27 MMOL/L (ref 21–32)
CREAT SERPL-MCNC: 2.7 MG/DL (ref 0.6–1.3)
DO-HGB MFR BLDA: 143.7 MMHG
EOSINOPHIL NFR BLD AUTO: 7.3 % (ref 0–6)
EOSINOPHIL NFR BLD MANUAL: 4 % (ref 0–4)
GLUCOSE SERPL-MCNC: 86 MG/DL (ref 74–106)
HCT VFR BLD AUTO: 25 % (ref 33–45)
HDLC SERPL-MCNC: 22 MG/DL (ref 40–60)
HGB BLD-MCNC: 7.8 G/DL (ref 11.5–14.8)
INHALED O2 CONCENTRATION: 40 %
INTRINSIC PEEP RESPIRATORY: 0 CM H2O
LDLC SERPL DIRECT ASSAY-MCNC: 23 MG/DL (ref 0–99)
LYMPHOCYTES NFR BLD AUTO: 0.7 /CMM (ref 0.8–4.8)
LYMPHOCYTES NFR BLD AUTO: 8.3 % (ref 20–44)
LYMPHOCYTES NFR BLD MANUAL: 18 % (ref 16–48)
MAGNESIUM SERPL-MCNC: 2.3 MG/DL (ref 1.8–2.4)
MCHC RBC AUTO-ENTMCNC: 31 G/DL (ref 31–36)
MCV RBC AUTO: 106 FL (ref 82–100)
MONOCYTES NFR BLD AUTO: 0.4 /CMM (ref 0.1–1.3)
MONOCYTES NFR BLD AUTO: 4.5 % (ref 2–12)
MONOCYTES NFR BLD MANUAL: 2 % (ref 0–11)
NEUTROPHILS # BLD AUTO: 6.4 /CMM (ref 1.8–8.9)
NEUTROPHILS NFR BLD AUTO: 79.5 % (ref 43–81)
NEUTS SEG NFR BLD MANUAL: 76 % (ref 42–76)
PCO2 TEMP ADJ BLDA: 68.2 MMHG (ref 35–45)
PEEP SETTING VENT: 340 ML
PH TEMP ADJ BLDA: 7.19 [PH] (ref 7.35–7.45)
PHOSPHATE SERPL-MCNC: 4.9 MG/DL (ref 2.5–4.9)
PLATELET # BLD AUTO: 73 /CMM (ref 150–450)
PO2 TEMP ADJ BLDA: 63.1 MMHG (ref 75–100)
POTASSIUM SERPL-SCNC: 4 MMOL/L (ref 3.5–5.1)
RBC # BLD AUTO: 2.39 MIL/UL (ref 4–5.2)
SAO2 % BLDA: 90.5 % (ref 92–98.5)
SET RATE, BG: 28
SODIUM SERPL-SCNC: 131 MMOL/L (ref 136–145)
TRIGL SERPL-MCNC: 56 MG/DL (ref 30–150)
WBC NRBC COR # BLD AUTO: 8 K/UL (ref 4.3–11)

## 2020-11-05 PROCEDURE — 5A1D70Z PERFORMANCE OF URINARY FILTRATION, INTERMITTENT, LESS THAN 6 HOURS PER DAY: ICD-10-PCS | Performed by: INTERNAL MEDICINE

## 2020-11-05 RX ADMIN — Medication SCH MG: at 10:06

## 2020-11-05 RX ADMIN — PANTOPRAZOLE SODIUM SCH MG: 40 GRANULE, DELAYED RELEASE ORAL at 10:05

## 2020-11-05 RX ADMIN — Medication SCH ML: at 17:09

## 2020-11-05 RX ADMIN — HYDROCORTISONE SODIUM SUCCINATE SCH MG: 100 INJECTION, POWDER, FOR SOLUTION INTRAMUSCULAR; INTRAVASCULAR at 17:09

## 2020-11-05 RX ADMIN — MIDODRINE HYDROCHLORIDE SCH MG: 5 TABLET ORAL at 12:32

## 2020-11-05 RX ADMIN — Medication SCH MG: at 21:46

## 2020-11-05 RX ADMIN — Medication SCH ML: at 12:35

## 2020-11-05 RX ADMIN — HYDROCORTISONE SODIUM SUCCINATE SCH MG: 100 INJECTION, POWDER, FOR SOLUTION INTRAMUSCULAR; INTRAVASCULAR at 10:06

## 2020-11-05 RX ADMIN — MIDODRINE HYDROCHLORIDE SCH MG: 5 TABLET ORAL at 17:09

## 2020-11-05 RX ADMIN — Medication PRN ML: at 00:22

## 2020-11-05 RX ADMIN — QUETIAPINE SCH MG: 25 TABLET, FILM COATED ORAL at 21:47

## 2020-11-05 NOTE — NUR
RN NOTE

NOTED WITH ORDER FOR NEPRO VIA GT @ 50ML/HOUR X 17 HOURS. WILL ADMINISTER REMAINING TUBE 
FEEDING IN BOTTLE, PAUSE FEEDING FOR 7 HOURS, AND RESUME AS ORDERED.

## 2020-11-05 NOTE — NUR
RT



Pt received trached on mechanical ventilation with noted settings. Vent alarms are set and 
audible with BVM and spare trach by bedside. Vent is plugged into red outlet. No SOB or 
respiratory distress noted. 

-------------------------------------------------------------------------------

Addendum: 11/05/20 at 1003 by DOLORES HURST RT

-------------------------------------------------------------------------------

Amended: Links added.

## 2020-11-05 NOTE — NUR
RN NOTE

RECEIVED CALL FROM [facility]. SPOKE TO SPENCER FROM [facility]. STATES NO BED IS 
AVAILABLE AT THIS TIME. STATES WILL CALL BACK WHEN BED AVAILABLE

## 2020-11-05 NOTE — NUR
received  pt from night shift, obtunded/lethargic, SR, T/V, sat well, lungs congested, BL 
hand edema present, no F/C HD patient, GT to feeding tolerates well, receiving levo at 
0.02mcg, v/s stable, no pain, pt turned and repositioned.

## 2020-11-05 NOTE — NUR
RT NOTE



PT RECEIVED TRACHED ON MECHANICAL VENTILATION. PT AWAKE. LORILEY 6 XLT TRACH IN PLACE. AMBU 
BAG/BACK UP TRACH @ BEDSIDE. SX DONE, TRACH SECURED AND PATENT. VENT PLUGGED TO RED OUTLET. 
ALARMS ON AND AUDIBLE. NO DISTRESS NOTED. WILL CONTINUE TO MONITOR T/O SHIFT.  

-------------------------------------------------------------------------------

Addendum: 11/05/20 at 2000 by AMARILIS LOOMIS RT

-------------------------------------------------------------------------------

Amended: Links added.

## 2020-11-05 NOTE — NUR
pt is resting in the bed, lethargic, does not follow commands, SR, sat well, having HD right 
now, off levo since 1500, v/s stable, no pain, pt cleane, changed and repositioned q2hrs.

## 2020-11-05 NOTE — NUR
RN NOTE

PT IN BED IN SEMI FOWLERS POSITION. TOLERATING CURRENT VENT SETTINGS. SINUS SHELDON ON THE 
MONITOR.. PT GTF RUNNING AT 50 ML/HR. LEVO 0.02MCG/KG/MIN INFUSING TO ANTHONY PICC. BED LOCKED 
AND IN LOWEST POSITION, SIDE RAILS UP X3. ENDORSED TO AM RN NO BED AVAILABLE AT [facility]. 
SPOKE TO SPENCER WILL CALL WHEN BED AVAILABLE.

## 2020-11-05 NOTE — NUR
RN NOTE

PT IN BED IN SEMI RAY'S POSITION. PT IS NON VERBAL BUT PHYSICALLY RESPONSIVE TO VERBAL 
AND TACTILE STIMULI. NO SIGNS OF PAIN OR DISCOMFORT. PT TOLERATING VENT SETTINGS WELL. TRACH 
MIDLINE AND IN PLACE, SUCTIONED VIA TRACH AND ORAL. RESPIRATIONS EVEN AND UNLABORED. PT 
CONNECTED TO BEDSIDE MONITOR. VITAL SIGNS STABLE. WITH GT RUNNING WITH NEPRO @ 50CC/HOUR AS 
ORDERED. NO RESIDUAL NOTED. FLUSHED WITHOUT ISSUE. IV LINES FLUSHED AND PATENT. HD CATHETER 
PATENT AND INTACT. SAFETY MEASURES IN PLACE, WILL MONITOR PATIENT.

## 2020-11-06 VITALS — SYSTOLIC BLOOD PRESSURE: 116 MMHG | DIASTOLIC BLOOD PRESSURE: 64 MMHG

## 2020-11-06 VITALS — DIASTOLIC BLOOD PRESSURE: 76 MMHG | SYSTOLIC BLOOD PRESSURE: 129 MMHG

## 2020-11-06 VITALS — DIASTOLIC BLOOD PRESSURE: 77 MMHG | SYSTOLIC BLOOD PRESSURE: 149 MMHG

## 2020-11-06 VITALS — DIASTOLIC BLOOD PRESSURE: 55 MMHG | SYSTOLIC BLOOD PRESSURE: 146 MMHG

## 2020-11-06 VITALS — DIASTOLIC BLOOD PRESSURE: 59 MMHG | SYSTOLIC BLOOD PRESSURE: 125 MMHG

## 2020-11-06 VITALS — SYSTOLIC BLOOD PRESSURE: 172 MMHG | DIASTOLIC BLOOD PRESSURE: 92 MMHG

## 2020-11-06 VITALS — SYSTOLIC BLOOD PRESSURE: 158 MMHG | DIASTOLIC BLOOD PRESSURE: 84 MMHG

## 2020-11-06 VITALS — SYSTOLIC BLOOD PRESSURE: 153 MMHG | DIASTOLIC BLOOD PRESSURE: 72 MMHG

## 2020-11-06 VITALS — DIASTOLIC BLOOD PRESSURE: 68 MMHG | SYSTOLIC BLOOD PRESSURE: 127 MMHG

## 2020-11-06 VITALS — DIASTOLIC BLOOD PRESSURE: 69 MMHG | SYSTOLIC BLOOD PRESSURE: 151 MMHG

## 2020-11-06 VITALS — SYSTOLIC BLOOD PRESSURE: 137 MMHG | DIASTOLIC BLOOD PRESSURE: 60 MMHG

## 2020-11-06 VITALS — DIASTOLIC BLOOD PRESSURE: 63 MMHG | SYSTOLIC BLOOD PRESSURE: 156 MMHG

## 2020-11-06 VITALS — SYSTOLIC BLOOD PRESSURE: 128 MMHG | DIASTOLIC BLOOD PRESSURE: 63 MMHG

## 2020-11-06 VITALS — DIASTOLIC BLOOD PRESSURE: 62 MMHG | SYSTOLIC BLOOD PRESSURE: 126 MMHG

## 2020-11-06 VITALS — DIASTOLIC BLOOD PRESSURE: 63 MMHG | SYSTOLIC BLOOD PRESSURE: 146 MMHG

## 2020-11-06 VITALS — SYSTOLIC BLOOD PRESSURE: 123 MMHG | DIASTOLIC BLOOD PRESSURE: 57 MMHG

## 2020-11-06 VITALS — SYSTOLIC BLOOD PRESSURE: 112 MMHG | DIASTOLIC BLOOD PRESSURE: 66 MMHG

## 2020-11-06 VITALS — SYSTOLIC BLOOD PRESSURE: 143 MMHG | DIASTOLIC BLOOD PRESSURE: 74 MMHG

## 2020-11-06 VITALS — DIASTOLIC BLOOD PRESSURE: 68 MMHG | SYSTOLIC BLOOD PRESSURE: 159 MMHG

## 2020-11-06 VITALS — SYSTOLIC BLOOD PRESSURE: 141 MMHG | DIASTOLIC BLOOD PRESSURE: 49 MMHG

## 2020-11-06 VITALS — DIASTOLIC BLOOD PRESSURE: 88 MMHG | SYSTOLIC BLOOD PRESSURE: 183 MMHG

## 2020-11-06 VITALS — DIASTOLIC BLOOD PRESSURE: 68 MMHG | SYSTOLIC BLOOD PRESSURE: 143 MMHG

## 2020-11-06 VITALS — DIASTOLIC BLOOD PRESSURE: 66 MMHG | SYSTOLIC BLOOD PRESSURE: 125 MMHG

## 2020-11-06 VITALS — DIASTOLIC BLOOD PRESSURE: 53 MMHG | SYSTOLIC BLOOD PRESSURE: 127 MMHG

## 2020-11-06 VITALS — SYSTOLIC BLOOD PRESSURE: 135 MMHG | DIASTOLIC BLOOD PRESSURE: 58 MMHG

## 2020-11-06 VITALS — SYSTOLIC BLOOD PRESSURE: 125 MMHG | DIASTOLIC BLOOD PRESSURE: 61 MMHG

## 2020-11-06 VITALS — SYSTOLIC BLOOD PRESSURE: 146 MMHG | DIASTOLIC BLOOD PRESSURE: 49 MMHG

## 2020-11-06 VITALS — SYSTOLIC BLOOD PRESSURE: 134 MMHG | DIASTOLIC BLOOD PRESSURE: 58 MMHG

## 2020-11-06 VITALS — DIASTOLIC BLOOD PRESSURE: 60 MMHG | SYSTOLIC BLOOD PRESSURE: 130 MMHG

## 2020-11-06 VITALS — SYSTOLIC BLOOD PRESSURE: 126 MMHG | DIASTOLIC BLOOD PRESSURE: 62 MMHG

## 2020-11-06 VITALS — DIASTOLIC BLOOD PRESSURE: 110 MMHG | SYSTOLIC BLOOD PRESSURE: 185 MMHG

## 2020-11-06 VITALS — DIASTOLIC BLOOD PRESSURE: 116 MMHG | SYSTOLIC BLOOD PRESSURE: 160 MMHG

## 2020-11-06 VITALS — DIASTOLIC BLOOD PRESSURE: 52 MMHG | SYSTOLIC BLOOD PRESSURE: 127 MMHG

## 2020-11-06 VITALS — SYSTOLIC BLOOD PRESSURE: 143 MMHG | DIASTOLIC BLOOD PRESSURE: 75 MMHG

## 2020-11-06 VITALS — DIASTOLIC BLOOD PRESSURE: 73 MMHG | SYSTOLIC BLOOD PRESSURE: 159 MMHG

## 2020-11-06 VITALS — DIASTOLIC BLOOD PRESSURE: 72 MMHG | SYSTOLIC BLOOD PRESSURE: 139 MMHG

## 2020-11-06 VITALS — DIASTOLIC BLOOD PRESSURE: 61 MMHG | SYSTOLIC BLOOD PRESSURE: 127 MMHG

## 2020-11-06 VITALS — SYSTOLIC BLOOD PRESSURE: 138 MMHG | DIASTOLIC BLOOD PRESSURE: 61 MMHG

## 2020-11-06 VITALS — SYSTOLIC BLOOD PRESSURE: 142 MMHG | DIASTOLIC BLOOD PRESSURE: 69 MMHG

## 2020-11-06 VITALS — DIASTOLIC BLOOD PRESSURE: 85 MMHG | SYSTOLIC BLOOD PRESSURE: 163 MMHG

## 2020-11-06 VITALS — SYSTOLIC BLOOD PRESSURE: 126 MMHG | DIASTOLIC BLOOD PRESSURE: 60 MMHG

## 2020-11-06 VITALS — DIASTOLIC BLOOD PRESSURE: 52 MMHG | SYSTOLIC BLOOD PRESSURE: 124 MMHG

## 2020-11-06 VITALS — DIASTOLIC BLOOD PRESSURE: 100 MMHG | SYSTOLIC BLOOD PRESSURE: 143 MMHG

## 2020-11-06 VITALS — SYSTOLIC BLOOD PRESSURE: 161 MMHG | DIASTOLIC BLOOD PRESSURE: 97 MMHG

## 2020-11-06 VITALS — SYSTOLIC BLOOD PRESSURE: 144 MMHG | DIASTOLIC BLOOD PRESSURE: 72 MMHG

## 2020-11-06 VITALS — SYSTOLIC BLOOD PRESSURE: 130 MMHG | DIASTOLIC BLOOD PRESSURE: 55 MMHG

## 2020-11-06 VITALS — DIASTOLIC BLOOD PRESSURE: 63 MMHG | SYSTOLIC BLOOD PRESSURE: 144 MMHG

## 2020-11-06 VITALS — DIASTOLIC BLOOD PRESSURE: 82 MMHG | SYSTOLIC BLOOD PRESSURE: 163 MMHG

## 2020-11-06 VITALS — DIASTOLIC BLOOD PRESSURE: 95 MMHG | SYSTOLIC BLOOD PRESSURE: 175 MMHG

## 2020-11-06 VITALS — SYSTOLIC BLOOD PRESSURE: 149 MMHG | DIASTOLIC BLOOD PRESSURE: 75 MMHG

## 2020-11-06 VITALS — DIASTOLIC BLOOD PRESSURE: 76 MMHG | SYSTOLIC BLOOD PRESSURE: 162 MMHG

## 2020-11-06 VITALS — SYSTOLIC BLOOD PRESSURE: 142 MMHG | DIASTOLIC BLOOD PRESSURE: 62 MMHG

## 2020-11-06 VITALS — SYSTOLIC BLOOD PRESSURE: 164 MMHG | DIASTOLIC BLOOD PRESSURE: 79 MMHG

## 2020-11-06 VITALS — DIASTOLIC BLOOD PRESSURE: 59 MMHG | SYSTOLIC BLOOD PRESSURE: 128 MMHG

## 2020-11-06 VITALS — SYSTOLIC BLOOD PRESSURE: 149 MMHG | DIASTOLIC BLOOD PRESSURE: 65 MMHG

## 2020-11-06 VITALS — SYSTOLIC BLOOD PRESSURE: 126 MMHG | DIASTOLIC BLOOD PRESSURE: 56 MMHG

## 2020-11-06 VITALS — DIASTOLIC BLOOD PRESSURE: 86 MMHG | SYSTOLIC BLOOD PRESSURE: 194 MMHG

## 2020-11-06 VITALS — SYSTOLIC BLOOD PRESSURE: 145 MMHG | DIASTOLIC BLOOD PRESSURE: 74 MMHG

## 2020-11-06 VITALS — DIASTOLIC BLOOD PRESSURE: 64 MMHG | SYSTOLIC BLOOD PRESSURE: 141 MMHG

## 2020-11-06 VITALS — DIASTOLIC BLOOD PRESSURE: 63 MMHG | SYSTOLIC BLOOD PRESSURE: 129 MMHG

## 2020-11-06 VITALS — SYSTOLIC BLOOD PRESSURE: 144 MMHG | DIASTOLIC BLOOD PRESSURE: 62 MMHG

## 2020-11-06 VITALS — DIASTOLIC BLOOD PRESSURE: 74 MMHG | SYSTOLIC BLOOD PRESSURE: 142 MMHG

## 2020-11-06 VITALS — SYSTOLIC BLOOD PRESSURE: 133 MMHG | DIASTOLIC BLOOD PRESSURE: 64 MMHG

## 2020-11-06 VITALS — SYSTOLIC BLOOD PRESSURE: 134 MMHG | DIASTOLIC BLOOD PRESSURE: 59 MMHG

## 2020-11-06 VITALS — SYSTOLIC BLOOD PRESSURE: 125 MMHG | DIASTOLIC BLOOD PRESSURE: 69 MMHG

## 2020-11-06 VITALS — DIASTOLIC BLOOD PRESSURE: 88 MMHG | SYSTOLIC BLOOD PRESSURE: 163 MMHG

## 2020-11-06 VITALS — DIASTOLIC BLOOD PRESSURE: 84 MMHG | SYSTOLIC BLOOD PRESSURE: 101 MMHG

## 2020-11-06 VITALS — SYSTOLIC BLOOD PRESSURE: 125 MMHG | DIASTOLIC BLOOD PRESSURE: 78 MMHG

## 2020-11-06 VITALS — DIASTOLIC BLOOD PRESSURE: 69 MMHG | SYSTOLIC BLOOD PRESSURE: 150 MMHG

## 2020-11-06 VITALS — DIASTOLIC BLOOD PRESSURE: 101 MMHG | SYSTOLIC BLOOD PRESSURE: 144 MMHG

## 2020-11-06 VITALS — SYSTOLIC BLOOD PRESSURE: 133 MMHG | DIASTOLIC BLOOD PRESSURE: 61 MMHG

## 2020-11-06 VITALS — DIASTOLIC BLOOD PRESSURE: 95 MMHG | SYSTOLIC BLOOD PRESSURE: 133 MMHG

## 2020-11-06 VITALS — SYSTOLIC BLOOD PRESSURE: 126 MMHG | DIASTOLIC BLOOD PRESSURE: 50 MMHG

## 2020-11-06 VITALS — DIASTOLIC BLOOD PRESSURE: 80 MMHG | SYSTOLIC BLOOD PRESSURE: 167 MMHG

## 2020-11-06 VITALS — SYSTOLIC BLOOD PRESSURE: 149 MMHG | DIASTOLIC BLOOD PRESSURE: 79 MMHG

## 2020-11-06 VITALS — SYSTOLIC BLOOD PRESSURE: 152 MMHG | DIASTOLIC BLOOD PRESSURE: 64 MMHG

## 2020-11-06 VITALS — SYSTOLIC BLOOD PRESSURE: 147 MMHG | DIASTOLIC BLOOD PRESSURE: 61 MMHG

## 2020-11-06 VITALS — SYSTOLIC BLOOD PRESSURE: 157 MMHG | DIASTOLIC BLOOD PRESSURE: 67 MMHG

## 2020-11-06 VITALS — SYSTOLIC BLOOD PRESSURE: 169 MMHG | DIASTOLIC BLOOD PRESSURE: 75 MMHG

## 2020-11-06 VITALS — SYSTOLIC BLOOD PRESSURE: 127 MMHG | DIASTOLIC BLOOD PRESSURE: 75 MMHG

## 2020-11-06 VITALS — SYSTOLIC BLOOD PRESSURE: 156 MMHG | DIASTOLIC BLOOD PRESSURE: 83 MMHG

## 2020-11-06 VITALS — DIASTOLIC BLOOD PRESSURE: 67 MMHG | SYSTOLIC BLOOD PRESSURE: 138 MMHG

## 2020-11-06 VITALS — SYSTOLIC BLOOD PRESSURE: 130 MMHG | DIASTOLIC BLOOD PRESSURE: 59 MMHG

## 2020-11-06 VITALS — DIASTOLIC BLOOD PRESSURE: 78 MMHG | SYSTOLIC BLOOD PRESSURE: 166 MMHG

## 2020-11-06 VITALS — DIASTOLIC BLOOD PRESSURE: 66 MMHG | SYSTOLIC BLOOD PRESSURE: 112 MMHG

## 2020-11-06 VITALS — DIASTOLIC BLOOD PRESSURE: 59 MMHG | SYSTOLIC BLOOD PRESSURE: 138 MMHG

## 2020-11-06 LAB
BASOPHILS # BLD AUTO: 0 /CMM (ref 0–0.2)
BASOPHILS NFR BLD AUTO: 0.1 % (ref 0–2)
BUN SERPL-MCNC: 45 MG/DL (ref 7–18)
CALCIUM SERPL-MCNC: 9 MG/DL (ref 8.5–10.1)
CHLORIDE SERPL-SCNC: 98 MMOL/L (ref 98–107)
CO2 SERPL-SCNC: 27 MMOL/L (ref 21–32)
CREAT SERPL-MCNC: 2.2 MG/DL (ref 0.6–1.3)
EOSINOPHIL NFR BLD AUTO: 0 % (ref 0–6)
FERRITIN SERPL-MCNC: 1634 NG/ML (ref 8–388)
GLUCOSE SERPL-MCNC: 247 MG/DL (ref 74–106)
HCT VFR BLD AUTO: 25 % (ref 33–45)
HGB BLD-MCNC: 7.7 G/DL (ref 11.5–14.8)
IRON SERPL-MCNC: 48 UG/DL (ref 50–175)
LYMPHOCYTES NFR BLD AUTO: 0.2 /CMM (ref 0.8–4.8)
LYMPHOCYTES NFR BLD AUTO: 3 % (ref 20–44)
LYMPHOCYTES NFR BLD MANUAL: 5 % (ref 16–48)
MCHC RBC AUTO-ENTMCNC: 31 G/DL (ref 31–36)
MCV RBC AUTO: 105 FL (ref 82–100)
MONOCYTES NFR BLD AUTO: 0 /CMM (ref 0.1–1.3)
MONOCYTES NFR BLD AUTO: 0.6 % (ref 2–12)
NEUTROPHILS # BLD AUTO: 6.4 /CMM (ref 1.8–8.9)
NEUTROPHILS NFR BLD AUTO: 96.3 % (ref 43–81)
NEUTS SEG NFR BLD MANUAL: 95 % (ref 42–76)
PLATELET # BLD AUTO: 79 /CMM (ref 150–450)
POTASSIUM SERPL-SCNC: 3.6 MMOL/L (ref 3.5–5.1)
RBC # BLD AUTO: 2.36 MIL/UL (ref 4–5.2)
SODIUM SERPL-SCNC: 133 MMOL/L (ref 136–145)
TIBC SERPL-MCNC: 132 UG/DL (ref 250–450)
WBC NRBC COR # BLD AUTO: 6.6 K/UL (ref 4.3–11)

## 2020-11-06 RX ADMIN — MIDODRINE HYDROCHLORIDE SCH MG: 5 TABLET ORAL at 13:35

## 2020-11-06 RX ADMIN — Medication SCH ML: at 08:44

## 2020-11-06 RX ADMIN — Medication SCH ML: at 13:36

## 2020-11-06 RX ADMIN — Medication PRN ML: at 10:03

## 2020-11-06 RX ADMIN — Medication SCH ML: at 17:49

## 2020-11-06 RX ADMIN — HYDROCORTISONE SODIUM SUCCINATE SCH MG: 100 INJECTION, POWDER, FOR SOLUTION INTRAMUSCULAR; INTRAVASCULAR at 17:53

## 2020-11-06 RX ADMIN — MIDODRINE HYDROCHLORIDE SCH MG: 5 TABLET ORAL at 17:00

## 2020-11-06 RX ADMIN — Medication SCH MG: at 08:37

## 2020-11-06 RX ADMIN — HYDROCORTISONE SODIUM SUCCINATE SCH MG: 100 INJECTION, POWDER, FOR SOLUTION INTRAMUSCULAR; INTRAVASCULAR at 10:03

## 2020-11-06 RX ADMIN — PANTOPRAZOLE SODIUM SCH MG: 40 GRANULE, DELAYED RELEASE ORAL at 08:37

## 2020-11-06 RX ADMIN — MIDODRINE HYDROCHLORIDE SCH MG: 5 TABLET ORAL at 08:37

## 2020-11-06 RX ADMIN — HYDROCORTISONE SODIUM SUCCINATE SCH MG: 100 INJECTION, POWDER, FOR SOLUTION INTRAMUSCULAR; INTRAVASCULAR at 01:49

## 2020-11-06 RX ADMIN — Medication SCH MG: at 21:17

## 2020-11-06 RX ADMIN — QUETIAPINE SCH MG: 25 TABLET, FILM COATED ORAL at 21:16

## 2020-11-06 NOTE — NUR
Received patient awake alert  non verbal follows simple commands.Afebrile.SR/SB 50'S.

Patient chronic vent dependent with trach to mechanical vent with prescribed settings .

Well tolerated SPO2 97%.Secretions suctioned PRN.No acute distress noted.GT feeding

Nepro infusing at 50 ml/hr.No residual noted.HOB elevated.HD cath to left upper chest

wall intact.Anuric.ANTHONY PICC line intact and patent.Turned and repositioned offloading 

pressure points.

## 2020-11-06 NOTE — NUR
RN CLOSING NOTE

Patient in room, tolerating vent settings well, no SOB or resp distress noted, comfort needs 
attended, call light in reach, mediations given, tolerating g-tube feeding well, no 
residual, central line dressing changed, wound care provided, picture of sacrum taken, 
resting comfortably, Safety measures in place, bed is locked in lowest position, HOB 
elevated, will endorse to PM shift RN for AJAY

## 2020-11-06 NOTE — NUR
Patient left with [facility] paramedics UNIT 40 via [facility] in stable condition.

Report and discharge papers given to Go.Patient has no belongings.

## 2020-11-06 NOTE — NUR
RN NOTE

NO ACUTE CHANGES OBSERVED OVERNIGHT. PT IS NON VERBAL BUT PHYSICALLY RESPONSIVE TO VERBAL 
AND TACTILE STIMULI. TOLERATING VENT SETTINGS WELL. HEAD OF BED ELEVATED. RESPIRATIONS EVEN 
AND UNLABORED. TRACH MID LINE AND IN PLACE. NO SIGNS OF PAIN OR DISCOMFORT. DVT PUMPS ON. GT 
FLUSHED AND PATENT. VITAL SIGNS STABLE VIA BEDSIDE MONITOR. IV SITES FLUSHED WITHOUT 
COMPLICATIONS AT SITES. ALL NEEDS MET AND ATTENDED TO, CALL LIGHT WITHIN REACH, SAFETY 
MEASURES IN PLACE, BED LOCKED AND IN LOWEST POSITION, SIDE RAILS UP X 2, WILL ENDORSE TO 
MORNING RN FOR AJAY.

## 2020-11-06 NOTE — NUR
WOUND CARE CONSULT: REVIEWED CHART, NURSING DOCUMENTATION AND PHOTOS WHICH INDICATE SACRAL 
FULL THICKNESS ULCER AND DEEP TISSUE INJURIES TO HEELS, PRESENT ON ADMISSION. 
RECOMMENDATIONS MADE FOR SKIN PROTECTION AND WOUND CARE. RECOMMEND SURGICAL AND DPM 
CONSULTS. DR CRYSTAL LORD AND DR SANTIAGO NOTIFIED OF CONSULT REQUESTS. PT IS ON [place] 
ISOFLEX LOW AIRLOSS BED. MD IN AGREEMENT WITH PLAN OF CARE.

## 2020-11-06 NOTE — NUR
Patient for transfer to [facility] awaiting for transport.Report given to Lady Jose Luis RN

in ICU at [facility] hospital.

## 2020-11-06 NOTE — NUR
RN NOTE

SPOKE TO BK (SON) AND DISCUSSED PT'S STATUS AND PLAN OF CARE. SON VERBALIZED 
UNDERSTANDING.

## 2020-11-06 NOTE — NUR
RN OPENING NOTE

PATEINT PRESENT IN BED, HOB ELEVATED, ON MECH VENT, TOLERATING SETTINGS WELL, SPO2 100%, NO 
SOB/DISTRESS NOTED, NON VERBAL, FOLLOWS COMMANDS, VITAL SIGHS STABLE, TELEMONITOR SR/SB, 
TRACH DRESSING IS INTACT, IV LINE ON L HAND, PICC  LINE ON R UA, AND HD CATH ON LCW NOTED, 
ALL INTACT, PATENT. G-TUBE NOTED, DISCONNECTED FROM FEEDING, FLUSHING WELL, NO RESIDUAL 
NOTED, SAFETY MEASURES IN PLACE, BED IS LOCKED, IN LOWEST POSITION, ALARMS WORKING, WILL 
CONT TO MONITOR

## 2020-11-06 NOTE — NUR
Spoke with  Lory, patient is going to [facility] to  7th floor room 
18 ([phone])  will be at 21:30 will endorse to PM shift RN

## 2020-11-06 NOTE — NUR
RN NOTE

COMPLETE BED BATH AND AM CARE COMPLETED. PT TOLERATED WELL. VITAL SIGNS STABLE. PT HAD SMALL 
BOWEL MOVEMENT (SMEAR). UNABLE TO OBTAIN SAMPLE FOR OCCULT BLOOD.

## 2020-11-06 NOTE — NUR
patient pointing on me,expresing facial grimace, moaning,  nodding head when asked "are you 
in pain?" blood pressures went up to 156/70, will administer PRN pain med

## 2020-11-06 NOTE — NUR
RN NOTE

TUBE FEEDING ADMINISTERED. GT FLUSHED. WILL ENDORSE TO MORNING RN TO RESUME TUBE FEEDING IN 
7 HOURS (1100) AS ORDERED. NEPRO @50CC/HOUR VIA GT X 17 HOURS.

## 2020-12-02 ENCOUNTER — HOSPITAL ENCOUNTER (EMERGENCY)
Dept: HOSPITAL 54 - ER | Age: 75
Discharge: NURSING, REHAB OR LONG-TERM CARE | End: 2020-12-02
Payer: MEDICARE

## 2020-12-02 VITALS — WEIGHT: 147 LBS | HEIGHT: 62 IN | BODY MASS INDEX: 27.05 KG/M2

## 2020-12-02 VITALS — SYSTOLIC BLOOD PRESSURE: 141 MMHG | DIASTOLIC BLOOD PRESSURE: 77 MMHG

## 2020-12-02 DIAGNOSIS — Z88.2: ICD-10-CM

## 2020-12-02 DIAGNOSIS — Z93.1: ICD-10-CM

## 2020-12-02 DIAGNOSIS — Z79.899: ICD-10-CM

## 2020-12-02 DIAGNOSIS — J44.9: ICD-10-CM

## 2020-12-02 DIAGNOSIS — Z99.2: ICD-10-CM

## 2020-12-02 DIAGNOSIS — N18.6: ICD-10-CM

## 2020-12-02 DIAGNOSIS — E11.22: ICD-10-CM

## 2020-12-02 DIAGNOSIS — R09.02: Primary | ICD-10-CM

## 2020-12-02 DIAGNOSIS — Z88.0: ICD-10-CM

## 2020-12-02 DIAGNOSIS — K21.9: ICD-10-CM

## 2020-12-02 DIAGNOSIS — D63.1: ICD-10-CM

## 2020-12-02 DIAGNOSIS — I12.0: ICD-10-CM

## 2020-12-02 DIAGNOSIS — F32.9: ICD-10-CM

## 2020-12-02 LAB
BASOPHILS # BLD AUTO: 0 /CMM (ref 0–0.2)
BASOPHILS NFR BLD AUTO: 0.3 % (ref 0–2)
BUN SERPL-MCNC: 14 MG/DL (ref 7–18)
CALCIUM SERPL-MCNC: 7.9 MG/DL (ref 8.5–10.1)
CHLORIDE SERPL-SCNC: 104 MMOL/L (ref 98–107)
CO2 SERPL-SCNC: 24 MMOL/L (ref 21–32)
CREAT SERPL-MCNC: 1.2 MG/DL (ref 0.6–1.3)
EOSINOPHIL NFR BLD AUTO: 2.4 % (ref 0–6)
GLUCOSE SERPL-MCNC: 116 MG/DL (ref 74–106)
HCT VFR BLD AUTO: 25 % (ref 33–45)
HGB BLD-MCNC: 8 G/DL (ref 11.5–14.8)
LYMPHOCYTES NFR BLD AUTO: 0.6 /CMM (ref 0.8–4.8)
LYMPHOCYTES NFR BLD AUTO: 4.6 % (ref 20–44)
MCHC RBC AUTO-ENTMCNC: 32 G/DL (ref 31–36)
MCV RBC AUTO: 102 FL (ref 82–100)
MONOCYTES NFR BLD AUTO: 0.6 /CMM (ref 0.1–1.3)
MONOCYTES NFR BLD AUTO: 4.1 % (ref 2–12)
NEUTROPHILS # BLD AUTO: 12.1 /CMM (ref 1.8–8.9)
NEUTROPHILS NFR BLD AUTO: 88.6 % (ref 43–81)
PLATELET # BLD AUTO: 109 /CMM (ref 150–450)
POTASSIUM SERPL-SCNC: 3.1 MMOL/L (ref 3.5–5.1)
RBC # BLD AUTO: 2.47 MIL/UL (ref 4–5.2)
SODIUM SERPL-SCNC: 140 MMOL/L (ref 136–145)
WBC NRBC COR # BLD AUTO: 13.7 K/UL (ref 4.3–11)

## 2020-12-02 NOTE — NUR
[facility] E[facility] TO [facility] VIA ALS & 
REPORT GIVEN TO JOVANNA HARO FOR CONT OF CARE.

## 2020-12-02 NOTE — NUR
BIB [facility] FROM DIALYSIS CENTER,TREATMENT STOPPED 30 MINUTES PRIOR TO 
COMPLETION, O2 SAT DECREASED TO 60% PER EMS. PT NON VERBAL, EYES CLOSED. PLACED 
ON CARDIAC MONITOR, SR. PT PLACED ON VENT. RR EVEN & UNLABORED. VSS. WILL CONT 
TO MONITOR.

## 2024-11-22 NOTE — NUR
BIBA [facility] from [facility] to [facility] 
AC18, 450 40%+5 High BP /100. Patient a/ox1, on trache vent dependent 
and tolerating well. Needs attended. Kept comfortable.
CALLED [facility] FOR RT TRANSPORT TO [place] [facility]. ETA 45 
MINUTES.
Patient discharged to SNF in stable condition. Written and verbal after care 
instructions given. Patient verbalizes understanding of instruction.
REPORT GIVEN TO EMT WITH RESPIRATORY THERAPY.
patient's vitals stable, no distress noted.
report given to ALAN key
seen by dr. rodriguez for eval.
yes